# Patient Record
Sex: MALE | Race: WHITE | Employment: OTHER | URBAN - METROPOLITAN AREA
[De-identification: names, ages, dates, MRNs, and addresses within clinical notes are randomized per-mention and may not be internally consistent; named-entity substitution may affect disease eponyms.]

---

## 2017-02-26 RX ORDER — ETRAVIRINE 100 MG/1
200 TABLET ORAL
Qty: 360 TAB | Refills: 0 | Status: SHIPPED | OUTPATIENT
Start: 2017-02-26 | End: 2018-02-14 | Stop reason: ALTCHOICE

## 2017-05-15 ENCOUNTER — OFFICE VISIT (OUTPATIENT)
Dept: INTERNAL MEDICINE CLINIC | Age: 52
End: 2017-05-15

## 2017-05-15 VITALS
HEIGHT: 68 IN | RESPIRATION RATE: 14 BRPM | BODY MASS INDEX: 23.04 KG/M2 | DIASTOLIC BLOOD PRESSURE: 80 MMHG | TEMPERATURE: 97.9 F | HEART RATE: 96 BPM | SYSTOLIC BLOOD PRESSURE: 124 MMHG | WEIGHT: 152 LBS | OXYGEN SATURATION: 95 %

## 2017-05-15 DIAGNOSIS — Z23 NEED FOR PNEUMOCOCCAL VACCINE: ICD-10-CM

## 2017-05-15 DIAGNOSIS — B20 HUMAN IMMUNODEFICIENCY VIRUS (HIV) DISEASE (HCC): Primary | ICD-10-CM

## 2017-05-15 RX ORDER — LORAZEPAM 0.5 MG/1
0.5 TABLET ORAL
Qty: 30 TAB | Refills: 1 | Status: SHIPPED | OUTPATIENT
Start: 2017-05-15 | End: 2018-02-14 | Stop reason: SDUPTHER

## 2017-05-15 NOTE — PROGRESS NOTES
1. Have you been to the ER, urgent care clinic since your last visit? Hospitalized since your last visit? No    2. Have you seen or consulted any other health care providers outside of the [address] [place] [address] since your last visit? Include any pap smears or colon screening. No       Chief Complaint   Patient presents with    HIV     6 month follow up     Not fasting. Requesting refill of Lorazepam today.

## 2017-05-15 NOTE — MR AVS SNAPSHOT
Visit Information Date & Time Provider Department Dept. Phone Encounter #  
 5/15/2017 10:15 AM Hardeep Lee, [address] [place] [address],[address] [phone] [id number] Follow-up Instructions Return if symptoms worsen or fail to improve. Upcoming Health Maintenance Date Due DTaP/Tdap/Td series (1 - Tdap) 7/18/1988 Pneumococcal 19-64 Highest Risk (3 of 3 - PPSV23) 2/14/2016 INFLUENZA AGE 9 TO ADULT 8/1/2017 Allergies as of 5/15/2017  Review Complete On: 5/15/2017 By: Hardeep Lee MD  
  
 Severity Noted Reaction Type Reactions Percocet [Oxycodone-acetaminophen]  07/06/2009    Nausea and Vomiting Vicodin [Hydrocodone-acetaminophen]  07/06/2009    Itching Current Immunizations  Reviewed on 5/15/2017 Name Date Influenza High Dose Vaccine PF 11/2/2015 Influenza Vaccine 11/14/2016, 10/24/2013 Influenza Vaccine PF 10/27/2014 Influenza Vaccine Split 9/14/2009 Influenza Vaccine Whole 12/6/2012 Pneumococcal Conjugate (PCV-13) 5/18/2015 Pneumococcal Polysaccharide (PPSV-23)  Incomplete Pneumococcal Vaccine (Unspecified Type) 2/14/2011 Reviewed by Hardeep Lee MD on 5/15/2017 at 10:29 AM  
You Were Diagnosed With   
  
 Codes Comments Human immunodeficiency virus (HIV) disease ([facility]    -  Primary ICD-10-CM: B20 
ICD-9-CM: 505 Need for pneumococcal vaccine     ICD-10-CM: N70 ICD-9-CM: V03.82 Vitals BP Pulse Temp Resp Height(growth percentile) Weight(growth percentile) 124/80 (BP 1 Location: Left arm, BP Patient Position: Sitting) 96 97.9 °F (36.6 °C) (Oral) 14 5' 8\" (1.727 m) 152 lb (68.9 kg) SpO2 BMI Smoking Status 95% 23.11 kg/m2 Former Smoker Vitals History BMI and BSA Data Body Mass Index Body Surface Area  
 23.11 kg/m 2 1.82 m 2 Preferred Pharmacy Pharmacy Name Phone [address], [facility] [address] Your Updated Medication List  
  
 This list is accurate as of: 5/15/17 10:39 AM.  Always use your most recent med list.  
  
  
  
  
 albuterol 90 mcg/actuation inhaler Commonly known as:  VENTOLIN HFA Take 1 Puff by inhalation every four (4) hours as needed for Wheezing. darunavir 600 mg tablet Commonly known as:  PREZISTA Take 600 mg by mouth two (2) times daily (with meals). emtricitabine-tenofovir (TDF) 200-300 mg per tablet Commonly known as:  Negro Muster Take 1 Tab by mouth daily. Etravirine 100 mg tablet Commonly known as:  Delanna Fitting Take 2 Tabs by mouth two (2) times daily (after meals). loperamide 2 mg tablet Commonly known as:  IMMODIUM Take 2 mg by mouth as needed for Diarrhea. LORazepam 0.5 mg tablet Commonly known as:  ATIVAN Take 1 Tab by mouth nightly as needed for Anxiety. Max Daily Amount: 0.5 mg. MULTIVITAMIN PO Take  by mouth. NICODERM CQ 21 mg/24 hr  
Generic drug:  nicotine 1 Patch by TransDERmal route every twenty-four (24) hours. Decreasing to 14 mg next week. predniSONE 10 mg dose pack Commonly known as:  STERAPRED DS Take as directed  
  
 ritonavir 100 mg capsule Commonly known as:  Justice Ruck Take 100 mg by mouth two (2) times daily (with meals). Prescriptions Printed Refills LORazepam (ATIVAN) 0.5 mg tablet 1 Sig: Take 1 Tab by mouth nightly as needed for Anxiety. Max Daily Amount: 0.5 mg.  
 Class: Print Route: Oral  
  
We Performed the Following HIV-1 RNA QT BY PCR [47795 CPT(R)] LYMPHOCYTES, CD4 PERCENT AND ABSOLUTE [67824 CPT(R)] METABOLIC PANEL, BASIC [47713 CPT(R)] PNEUMOCOCCAL POLYSACCHARIDE VACCINE, 23-VALENT, ADULT OR IMMUNOSUPPRESSED PT DOSE, [25191 CPT(R)] Follow-up Instructions Return if symptoms worsen or fail to improve. Introducing [facility]!    
 Danette Rodríguez introduces Z[employer]oop patient portal. Now you can access parts of your medical record, email your doctor's office, and request medication refills online. 1. In your internet browser, go to [email or web address] 2. Click on the First Time User? Click Here link in the Sign In box. You will see the New Member Sign Up page. 3. Enter your Anyone Home Access Code exactly as it appears below. You will not need to use this code after youve completed the sign-up process. If you do not sign up before the expiration date, you must request a new code. · Anyone Home Access Code: [email or web address] Expires: 8/13/2017 10:38 AM 
 
4. Enter the last four digits of your Social Security Number (xxxx) and Date of Birth (mm/dd/yyyy) as indicated and click Submit. You will be taken to the next sign-up page. 5. Create a Anyone Home ID. This will be your Anyone Home login ID and cannot be changed, so think of one that is secure and easy to remember. 6. Create a Anyone Home password. You can change your password at any time. 7. Enter your Password Reset Question and Answer. This can be used at a later time if you forget your password. 8. Enter your e-mail address. You will receive e-mail notification when new information is available in [address] 9. Click Sign Up. You can now view and download portions of your medical record. 10. Click the Download Summary menu link to download a portable copy of your medical information. If you have questions, please visit the Frequently Asked Questions section of the Anyone Home website. Remember, Anyone Home is NOT to be used for urgent needs. For medical emergencies, dial 911. Now available from your iPhone and Android! Please provide this summary of care documentation to your next provider. Your primary care clinician is listed as [place]. If you have any questions after today's visit, please call [phone].

## 2017-05-16 LAB
BASOPHILS # BLD AUTO: 0 X10E3/UL (ref 0–0.2)
BASOPHILS NFR BLD AUTO: 1 %
BUN SERPL-MCNC: 11 MG/DL (ref 6–24)
BUN/CREAT SERPL: 12 (ref 9–20)
CALCIUM SERPL-MCNC: 9.2 MG/DL (ref 8.7–10.2)
CD3+CD4+ CELLS # BLD: 751 /UL (ref 359–1519)
CD3+CD4+ CELLS NFR BLD: 41.7 % (ref 30.8–58.5)
CHLORIDE SERPL-SCNC: 101 MMOL/L (ref 96–106)
CO2 SERPL-SCNC: 22 MMOL/L (ref 18–29)
CREAT SERPL-MCNC: 0.93 MG/DL (ref 0.76–1.27)
EOSINOPHIL # BLD AUTO: 0.1 X10E3/UL (ref 0–0.4)
EOSINOPHIL NFR BLD AUTO: 1 %
ERYTHROCYTE [DISTWIDTH] IN BLOOD BY AUTOMATED COUNT: 12.7 % (ref 12.3–15.4)
GLUCOSE SERPL-MCNC: 90 MG/DL (ref 65–99)
HCT VFR BLD AUTO: 43.8 % (ref 37.5–51)
HGB BLD-MCNC: 15 G/DL (ref 12.6–17.7)
HIV1 RNA # SERPL NAA+PROBE: 40 COPIES/ML
HIV1 RNA SERPL NAA+PROBE-LOG#: 1.6 LOG10COPY/ML
IMM GRANULOCYTES # BLD: 0 X10E3/UL (ref 0–0.1)
IMM GRANULOCYTES NFR BLD: 0 %
LYMPHOCYTES # BLD AUTO: 1.8 X10E3/UL (ref 0.7–3.1)
LYMPHOCYTES NFR BLD AUTO: 26 %
MCH RBC QN AUTO: 33.6 PG (ref 26.6–33)
MCHC RBC AUTO-ENTMCNC: 34.2 G/DL (ref 31.5–35.7)
MCV RBC AUTO: 98 FL (ref 79–97)
MONOCYTES # BLD AUTO: 0.8 X10E3/UL (ref 0.1–0.9)
MONOCYTES NFR BLD AUTO: 11 %
NEUTROPHILS # BLD AUTO: 4.2 X10E3/UL (ref 1.4–7)
NEUTROPHILS NFR BLD AUTO: 61 %
PLATELET # BLD AUTO: 190 X10E3/UL (ref 150–379)
POTASSIUM SERPL-SCNC: 4.4 MMOL/L (ref 3.5–5.2)
RBC # BLD AUTO: 4.47 X10E6/UL (ref 4.14–5.8)
SODIUM SERPL-SCNC: 140 MMOL/L (ref 134–144)
WBC # BLD AUTO: 7 X10E3/UL (ref 3.4–10.8)

## 2017-08-14 ENCOUNTER — OFFICE VISIT (OUTPATIENT)
Dept: INTERNAL MEDICINE CLINIC | Age: 52
End: 2017-08-14

## 2017-08-14 VITALS
DIASTOLIC BLOOD PRESSURE: 81 MMHG | HEART RATE: 97 BPM | RESPIRATION RATE: 18 BRPM | BODY MASS INDEX: 22.73 KG/M2 | OXYGEN SATURATION: 95 % | SYSTOLIC BLOOD PRESSURE: 144 MMHG | TEMPERATURE: 98.1 F | HEIGHT: 68 IN | WEIGHT: 150 LBS

## 2017-08-14 DIAGNOSIS — B20 HUMAN IMMUNODEFICIENCY VIRUS (HIV) DISEASE (HCC): Primary | ICD-10-CM

## 2017-08-14 NOTE — PROGRESS NOTES
Osbaldo Greer MD   Infectious Diseases             HPI Cash Henry is a 54 y.o., male and is here today for follow up of HIV infection. The patient has been 100% compliant with HAART. Last CD4 count and Viral Load [id number] (41.7%) and 40 cps/ml on 5/15/17. BMP on that date was normal.  Current HAART is comprised of boosted darunavir,etravirine, and Truvada. He checked with his insurer regarding possible switch to the new or once daily drugs (Genvoya plus Prezcobix), which would have decreased his pill burden significantly. It was too expensive. He would like to try copay cards with both meds, however.      Still smoking under stress, but less so. 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 No new medications have been added since last visit.      Allergy history, medication list, past medical history, and social history were all reviewed. No changes were made in any of these.      Up to date on all immunizations.              ROS   No fever, sweats, chills, nausea, vomiting, diarrhea, or rash. Weight has been stable. Level of energy is good. No changes in vision. No headaches or cognitive impairment. No medications have been added to the regimen since last visit. No signs of peripheral neuropathy.       Physical Exam  Vital signs and weight are stable. EOMI. Sclera anicteric. No thrush or leukoplakia. Lungs clear to A&P. Regular rhythm without murmur. Abdomen without organomegaly, mass, or tenderness. Bowel sounds normal. No joint abnormalities or edema. No lymphadenopathy. Neurologic exam is nonfocal.          ASSESSMENT and PLAN  #1: HIV infection. HIV is clinically and virologically stable. No change in HAART is planned at this time. CD4 count and viral load will be ordered today. Patient will call back for lab work in one week and will return in 4 mos. Copay cards given for Genvoya and Prezcobix. He will check with his mail order and local pharmacy to see if they will honor these.  .      #2: Tobacco abuse.  Again counseled on quitting smoking.

## 2017-08-14 NOTE — MR AVS SNAPSHOT
Visit Information Date & Time Provider Department Dept. Phone Encounter #  
 8/14/2017 11:30 AM Manuelito Mayes, [address] [place] [address],[address] [phone] [id number] Follow-up Instructions Return in about 4 months (around 12/14/2017), or if symptoms worsen or fail to improve. Upcoming Health Maintenance Date Due DTaP/Tdap/Td series (1 - Tdap) 7/18/1988 FOBT Q 1 YEAR AGE 50-75 7/18/2017 INFLUENZA AGE 9 TO ADULT 8/1/2017 Allergies as of 8/14/2017  Review Complete On: 8/14/2017 By: Manuelito Mayes MD  
  
 Severity Noted Reaction Type Reactions Percocet [Oxycodone-acetaminophen]  07/06/2009    Nausea and Vomiting Vicodin [Hydrocodone-acetaminophen]  07/06/2009    Itching Current Immunizations  Reviewed on 8/14/2017 Name Date Influenza High Dose Vaccine PF 11/2/2015 Influenza Vaccine 11/14/2016, 10/24/2013 Influenza Vaccine PF 10/27/2014 Influenza Vaccine Split 9/14/2009 Influenza Vaccine Whole 12/6/2012 Pneumococcal Conjugate (PCV-13) 5/18/2015 Pneumococcal Polysaccharide (PPSV-23) 5/15/2017 ZZZ-RETIRED (DO NOT USE) Pneumococcal Vaccine (Unspecified Type) 2/14/2011 Reviewed by Manuelito Mayes MD on 8/14/2017 at 11:25 AM  
You Were Diagnosed With   
  
 Codes Comments Human immunodeficiency virus (HIV) disease ([facility]    -  Primary ICD-10-CM: B20 
ICD-9-CM: 003 Vitals BP Pulse Temp Resp Height(growth percentile) Weight(growth percentile) 144/81 (BP 1 Location: Left arm, BP Patient Position: Sitting) 97 98.1 °F (36.7 °C) (Oral) 18 5' 8\" (1.727 m) 150 lb (68 kg) SpO2 BMI Smoking Status 95% 22.81 kg/m2 Current Some Day Smoker Vitals History BMI and BSA Data Body Mass Index Body Surface Area  
 22.81 kg/m 2 1.81 m 2 Preferred Pharmacy Pharmacy Name Phone [address], [facility] [address] Your Updated Medication List  
  
   
 This list is accurate as of: 8/14/17 11:33 AM.  Always use your most recent med list.  
  
  
  
  
 albuterol 90 mcg/actuation inhaler Commonly known as:  VENTOLIN HFA Take 1 Puff by inhalation every four (4) hours as needed for Wheezing. darunavir 600 mg tablet Commonly known as:  PREZISTA Take 1 Tab by mouth two (2) times daily (with meals). darunavir-cobicistat 800-150 mg-mg per tablet Commonly known as:  PREZCOBIX Take 1 Tab by mouth daily for 90 days. elvitegravir-cobicistat-emtricitabine-tenofovir alafenamide Tab tablet Commonly known as:  Uday Singh Take 1 Tab by mouth daily for 90 days. emtricitabine-tenofovir (TDF) 200-300 mg per tablet Commonly known as:  Cesar Mention Take 1 Tab by mouth daily. Etravirine 100 mg tablet Commonly known as:  Seamus Anne Take 2 Tabs by mouth two (2) times daily (after meals). loperamide 2 mg tablet Commonly known as:  IMMODIUM Take 2 mg by mouth as needed for Diarrhea. LORazepam 0.5 mg tablet Commonly known as:  ATIVAN Take 1 Tab by mouth nightly as needed for Anxiety. Max Daily Amount: 0.5 mg. MULTIVITAMIN PO Take  by mouth. NICODERM CQ 21 mg/24 hr  
Generic drug:  nicotine 1 Patch by TransDERmal route every twenty-four (24) hours. Decreasing to 14 mg next week. predniSONE 10 mg dose pack Commonly known as:  STERAPRED DS Take as directed  
  
 ritonavir 100 mg capsule Commonly known as:  Ulysses Edu Take 1 Cap by mouth two (2) times daily (with meals). Prescriptions Printed Refills  
 darunavir-cobicistat (PREZCOBIX) 800-150 mg-mg per tablet 5 Sig: Take 1 Tab by mouth daily for 90 days. Class: Print Route: Oral  
 elvitegravir-cobicistat-emtricitabine-tenofovir alafenamide (GENVOYA) tab tablet 5 Sig: Take 1 Tab by mouth daily for 90 days. Class: Print Route: Oral  
  
We Performed the Following HIV-1 RNA QT BY PCR [28415 CPT(R)] LYMPHOCYTES, CD4 PERCENT AND ABSOLUTE [64404 CPT(R)] Follow-up Instructions Return in about 4 months (around 12/14/2017), or if symptoms worsen or fail to improve. Introducing [facility]! Sheri Baeza introduces [employer] patient portal. Now you can access parts of your medical record, email your doctor's office, and request medication refills online. 1. In your internet browser, go to [email or web address] 2. Click on the First Time User? Click Here link in the Sign In box. You will see the New Member Sign Up page. 3. Enter your Miner Access Code exactly as it appears below. You will not need to use this code after youve completed the sign-up process. If you do not sign up before the expiration date, you must request a new code. · Miner Access Code: [email or web address] Expires: 11/12/2017 11:28 AM 
 
4. Enter the last four digits of your Social Security Number (xxxx) and Date of Birth (mm/dd/yyyy) as indicated and click Submit. You will be taken to the next sign-up page. 5. Create a Miner ID. This will be your Miner login ID and cannot be changed, so think of one that is secure and easy to remember. 6. Create a Miner password. You can change your password at any time. 7. Enter your Password Reset Question and Answer. This can be used at a later time if you forget your password. 8. Enter your e-mail address. You will receive e-mail notification when new information is available in [address] 9. Click Sign Up. You can now view and download portions of your medical record. 10. Click the Download Summary menu link to download a portable copy of your medical information. If you have questions, please visit the Frequently Asked Questions section of the Miner website. Remember, Miner is NOT to be used for urgent needs. For medical emergencies, dial 911. Now available from your iPhone and Android! Please provide this summary of care documentation to your next provider. Your primary care clinician is listed as [place]. If you have any questions after today's visit, please call [phone].

## 2017-08-15 DIAGNOSIS — B20 HUMAN IMMUNODEFICIENCY VIRUS (HIV) DISEASE (HCC): ICD-10-CM

## 2017-08-15 LAB
BASOPHILS # BLD AUTO: 0.1 X10E3/UL (ref 0–0.2)
BASOPHILS NFR BLD AUTO: 1 %
CD3+CD4+ CELLS # BLD: 702 /UL (ref 359–1519)
CD3+CD4+ CELLS NFR BLD: 41.3 % (ref 30.8–58.5)
EOSINOPHIL # BLD AUTO: 0.1 X10E3/UL (ref 0–0.4)
EOSINOPHIL NFR BLD AUTO: 2 %
ERYTHROCYTE [DISTWIDTH] IN BLOOD BY AUTOMATED COUNT: 12.5 % (ref 12.3–15.4)
HCT VFR BLD AUTO: 42.9 % (ref 37.5–51)
HGB BLD-MCNC: 14.8 G/DL (ref 12.6–17.7)
HIV1 RNA # SERPL NAA+PROBE: <20 COPIES/ML
HIV1 RNA SERPL NAA+PROBE-LOG#: NORMAL LOG10COPY/ML
IMM GRANULOCYTES # BLD: 0 X10E3/UL (ref 0–0.1)
IMM GRANULOCYTES NFR BLD: 0 %
LYMPHOCYTES # BLD AUTO: 1.7 X10E3/UL (ref 0.7–3.1)
LYMPHOCYTES NFR BLD AUTO: 31 %
MCH RBC QN AUTO: 34.2 PG (ref 26.6–33)
MCHC RBC AUTO-ENTMCNC: 34.5 G/DL (ref 31.5–35.7)
MCV RBC AUTO: 99 FL (ref 79–97)
MONOCYTES # BLD AUTO: 0.7 X10E3/UL (ref 0.1–0.9)
MONOCYTES NFR BLD AUTO: 14 %
NEUTROPHILS # BLD AUTO: 2.8 X10E3/UL (ref 1.4–7)
NEUTROPHILS NFR BLD AUTO: 52 %
PLATELET # BLD AUTO: 212 X10E3/UL (ref 150–379)
RBC # BLD AUTO: 4.33 X10E6/UL (ref 4.14–5.8)
WBC # BLD AUTO: 5.3 X10E3/UL (ref 3.4–10.8)

## 2017-08-17 ENCOUNTER — DOCUMENTATION ONLY (OUTPATIENT)
Dept: INTERNAL MEDICINE CLINIC | Age: 52
End: 2017-08-17

## 2017-08-17 NOTE — PROGRESS NOTES
I erroneously ordered Prezcobix instead of Prezista 800 mg for patient to take daily with Aleksandra Love. Pharmacy notified to make change.

## 2017-08-21 ENCOUNTER — TELEPHONE (OUTPATIENT)
Dept: INTERNAL MEDICINE CLINIC | Age: 52
End: 2017-08-21

## 2017-08-21 NOTE — TELEPHONE ENCOUNTER
Spoke with patient after 2 patient identifiers being note and advised per Dr. Hector Atkinson, that He should be on Prezista 800 mg daily. Patient expressed understanding and has no further questions at this time.

## 2017-08-21 NOTE — TELEPHONE ENCOUNTER
Patient states he needs an urgent call back on medication that is required by Dr. Funmilayo Bernardo. Patient states a medication change was made to prescriptions called in & patient states he needs to be discuss his medications as he needs these to be able to be received by this Friday. Please call.  Thank you

## 2017-11-13 NOTE — TELEPHONE ENCOUNTER
#[phone] pt is requesting a call as he only has 3 more days of meds from Dr. Destiney Arauoj and can't see the new physician as it is on the wrong days. What does he do? Please call to advise.

## 2017-11-13 NOTE — TELEPHONE ENCOUNTER
Identified patient 2 identifiers verified. Patient  Is unable to get an appointment with ID due his work schedule does not work with the ID schedule. Requesting refill no his meds.

## 2018-02-14 ENCOUNTER — OFFICE VISIT (OUTPATIENT)
Dept: FAMILY MEDICINE CLINIC | Age: 53
End: 2018-02-14

## 2018-02-14 VITALS
HEIGHT: 68 IN | SYSTOLIC BLOOD PRESSURE: 116 MMHG | RESPIRATION RATE: 20 BRPM | OXYGEN SATURATION: 97 % | HEART RATE: 98 BPM | TEMPERATURE: 96.5 F | WEIGHT: 150 LBS | DIASTOLIC BLOOD PRESSURE: 82 MMHG | BODY MASS INDEX: 22.73 KG/M2

## 2018-02-14 DIAGNOSIS — B20 HIV (HUMAN IMMUNODEFICIENCY VIRUS INFECTION) (HCC): Primary | ICD-10-CM

## 2018-02-14 DIAGNOSIS — Z76.89 ESTABLISHING CARE WITH NEW DOCTOR, ENCOUNTER FOR: ICD-10-CM

## 2018-02-14 DIAGNOSIS — G47.00 INSOMNIA, UNSPECIFIED TYPE: ICD-10-CM

## 2018-02-14 RX ORDER — LORAZEPAM 0.5 MG/1
0.5 TABLET ORAL
Qty: 15 TAB | Refills: 0 | Status: SHIPPED | OUTPATIENT
Start: 2018-02-14 | End: 2020-07-10

## 2018-02-14 NOTE — PROGRESS NOTES
Venkata Rascon is a 50 y.o. male, who's a new patient to our practice. Previous PCP: stated Nieves Gale but he haven't seen him in 3+ years. has a past medical history of Dyslipidemia; Human immunodeficiency virus (HIV) disease; Hyperglycemia; Insomnia (11/14/2011); and Shingles. ID: HIV saw Dr. Rowdy Olivia, stated saw him on his last day which was 08/2017. It's been 6months since f/u. He says the Other ID doctors out patients are 2days week and \"That's not gonna work for [employer]". I advice him that we'll do 1 refill of 1 month he's almost out of antiviral. His responsibility to f/u with ID. I will do another refer to ID for him, advice we have 2 IDs doctor in this clinic. Insomnia: report takes Lorazepam written by Dr. Martin Fung for sleep, about 2d/week. Advice we'll need to revisit this issue, there are better choices for insomnia. We'll refill #15. Reviewed: active problem list, medication list, allergies, family history, social history, notes from last encounter, lab results    A comprehensive review of systems was negative except for that written in the HPI, on 14 ROS. Allergies   Allergen Reactions    Percocet [Oxycodone-Acetaminophen] Nausea and Vomiting    Vicodin [Hydrocodone-Acetaminophen] Itching     No current outpatient prescriptions on file prior to visit. No current facility-administered medications on file prior to visit. Patient Active Problem List   Diagnosis Code    HIV (human immunodeficiency virus infection) ([facility] B20    Insomnia G47.00       Visit Vitals    /82    Pulse 98    Temp 96.5 °F (35.8 °C) (Oral)    Resp 20    Ht 5' 8\" (1.727 m)    Wt 150 lb (68 kg)    SpO2 97%    BMI 22.81 kg/m2     General appearance: alert, cooperative, no distress, appears stated age  Neurologic: Alert and oriented X 3, normal strength and tone, symmetric. Normal without focal findings. Cranial nerves 2-12 intact. Normal coordination and gait.   Mental status: Alert, oriented, thought content appropriate, affect: stable, mood-congruent. Head: Normocephalic, without obvious abnormality, atraumatic  Eyes: conjunctivae/corneas clear. PERRL, EOM's intact. Neck: supple, symmetrical, trachea midline, no JVD  Lungs: clear to auscultation bilaterally  Heart: regular rate and rhythm, S1, S2 normal, no murmur, click, rub or gallop  Abdomen: soft, non-tender. Extremities: extremities normal, atraumatic, no cyanosis or edema      Assessment/Plans:    Diagnoses and all orders for this visit:    1. HIV (human immunodeficiency virus infection) ([facility]  -     elvitegravir-cobicistat-emtricitabine-tenofovir alafenamide (GENVOYA) tab tablet; Take 1 Tab by mouth daily (with breakfast) for 90 days. -     darunavir (PREZISTA) 600 mg tablet; Take 1 Tab by mouth two (2) times daily (with meals). -     [facility] Infectious Disease ref [facility]    2. Establishing care with new doctor, encounter for    3. Insomnia, unspecified type  -     LORazepam (ATIVAN) 0.5 mg tablet; Take 1 Tab by mouth nightly as needed for Anxiety. Max Daily Amount: 0.5 mg.      Discussed plans, risk/benefits of treatments/observations. Through the use of shared decision making, above plans were agreed upon. Medication compliance advised. Patient verbalized understanding.      Follow-up Disposition:  Return in about 2 months (around 4/14/2018) for annual exam.      Kya Turpin MD  2/14/2018

## 2018-02-14 NOTE — MR AVS SNAPSHOT
[address] [place] [address] [facility] 
[phone] Patient: Nina Leal MRN:  [id number] Visit Information Date & Time Provider Department Dept. Phone Encounter #  
 2018 10:20 AM MD Misha Lopezanaya Guillen at [address] [place] [address] [id number] Follow-up Instructions Return in about 2 months (around 2018) for annual exam. Upcoming Health Maintenance Date Due DTaP/Tdap/Td series (1 - Tdap) 1988 FOBT Q 1 YEAR AGE 50-75 2017 Allergies as of 2018  Review Complete On: 2018 By: Amandeep Kumari MD  
  
 Severity Noted Reaction Type Reactions Percocet [Oxycodone-acetaminophen]  2009    Nausea and Vomiting Vicodin [Hydrocodone-acetaminophen]  2009    Itching Current Immunizations  Reviewed on 2017 Name Date Influenza High Dose Vaccine PF 2015 Influenza Vaccine 2016, 10/24/2013 Influenza Vaccine PF 10/27/2014 Influenza Vaccine Split 2009 Influenza Vaccine Whole 2012 Pneumococcal Conjugate (PCV-13) 2015 Pneumococcal Polysaccharide (PPSV-23) 5/15/2017 Td 3/22/2008 ZZZ-RETIRED (DO NOT USE) Pneumococcal Vaccine (Unspecified Type) 2011 Not reviewed this visit You Were Diagnosed With   
  
 Codes Comments HIV (human immunodeficiency virus infection) ([facility]    -  Primary ICD-10-CM: B20 
ICD-9-CM: V08 Establishing care with new doctor, encounter for     ICD-10-CM: Z76.89 
ICD-9-CM: V65.8 Insomnia, unspecified type     ICD-10-CM: G47.00 ICD-9-CM: 780.52 Vitals BP Pulse Temp Resp Height(growth percentile) Weight(growth percentile) 116/82 98 96.5 °F (35.8 °C) (Oral) 20 5' 8\" (1.727 m) 150 lb (68 kg) SpO2 BMI Smoking Status 97% 22.81 kg/m2 Current Every Day Smoker Vitals History BMI and BSA Data Body Mass Index Body Surface Area 22.81 kg/m 2 1.81 m 2 Preferred Pharmacy Pharmacy Name Phone [address] [facility], [address] Your Updated Medication List  
  
   
This list is accurate as of: 2/14/18 10:49 AM.  Always use your most recent med list.  
  
  
  
  
 darunavir 600 mg tablet Commonly known as:  PREZISTA Take 1 Tab by mouth two (2) times daily (with meals). elvitegravir-cobicistat-emtricitabine-tenofovir alafenamide Tab tablet Commonly known as:   [place] Take 1 Tab by mouth daily (with breakfast) for 90 days. LORazepam 0.5 mg tablet Commonly known as:  ATIVAN Take 1 Tab by mouth nightly as needed for Anxiety. Max Daily Amount: 0.5 mg.  
  
  
  
  
Prescriptions Printed Refills LORazepam (ATIVAN) 0.5 mg tablet 0 Sig: Take 1 Tab by mouth nightly as needed for Anxiety. Max Daily Amount: 0.5 mg.  
 Class: Print Route: Oral  
  
Prescriptions Sent to Pharmacy Refills  
 elvitegravir-cobicistat-emtricitabine-tenofovir alafenamide (GENVOYA) tab tablet 0 Sig: Take 1 Tab by mouth daily (with breakfast) for 90 days. Class: Normal  
 Pharmacy: [address] Ph #: [phone] Route: Oral  
 darunavir (PREZISTA) 600 mg tablet 0 Sig: Take 1 Tab by mouth two (2) times daily (with meals). Class: Normal  
 Pharmacy: [address] Ph #: [phone] Route: Oral  
  
We Performed the Following REFERRAL TO INFECTIOUS DISEASE [REF37 Custom] Follow-up Instructions Return in about 2 months (around 4/14/2018) for annual exam.  
  
  
Referral Information Referral ID Referred By Referred To  
  
 [id number] ARABELLA NETTLES MD   
   [place] [address] , [address] Phone: [phone] Fax: [phone] Visits Status Start Date End Date 1 New Request 2/14/18 2/14/19 If your referral has a status of pending review or denied, additional information will be sent to support the outcome of this decision. Introducing [facility]! Cadence Costa introduces [employer] patient portal. Now you can access parts of your medical record, email your doctor's office, and request medication refills online. 1. In your internet browser, go to [email or web address] 2. Click on the First Time User? Click Here link in the Sign In box. You will see the New Member Sign Up page. 3. Enter your [employer] Access Code exactly as it appears below. You will not need to use this code after youve completed the sign-up process. If you do not sign up before the expiration date, you must request a new code. · [employer] Access Code: [email or web address] Expires: 5/15/2018 10:49 AM 
 
4. Enter the last four digits of your Social Security Number (xxxx) and Date of Birth (mm/dd/yyyy) as indicated and click Submit. You will be taken to the next sign-up page. 5. Create a [employer] ID. This will be your [employer] login ID and cannot be changed, so think of one that is secure and easy to remember. 6. Create a [employer] password. You can change your password at any time. 7. Enter your Password Reset Question and Answer. This can be used at a later time if you forget your password. 8. Enter your e-mail address. You will receive e-mail notification when new information is available in [address] 9. Click Sign Up. You can now view and download portions of your medical record. 10. Click the Download Summary menu link to download a portable copy of your medical information. If you have questions, please visit the Frequently Asked Questions section of the [employer] website. Remember, [employer] is NOT to be used for urgent needs. For medical emergencies, dial 911. Now available from your iPhone and Android! Please provide this summary of care documentation to your next provider. Your primary care clinician is listed as [place]. If you have any questions after today's visit, please call [phone].

## 2018-02-15 NOTE — TELEPHONE ENCOUNTER
Jayson Buitrago from [facility] @ [phone] calling about the medication   Rx:darunavir (PREZISTA) 600 mg tablet twice a  Day . Pt was taking 800 mg once a day and would like to know if you increasing the medication for the Pt.   Ref #[id number]

## 2018-02-15 NOTE — TELEPHONE ENCOUNTER
MD Tracy Lopez LPN        Caller: Unspecified (Today,  9:32 AM)                     PLease let them know     No, he hadn't f/u with ID for his HIV, he's new to me and he can't remember his dosing. I refilled what was in our chart for 1 month until he reconnect with ID doctor.     Please refill it to whatever dose he was on recently.       Thank you     Amandeep Kumari MD   2/15/2018       Spoke with Pharmacist and med changed to darunavir-cobicistat (PREZCOBIX) 800-150 mg-mg per tablet 1 daily.

## 2018-03-21 ENCOUNTER — TELEPHONE (OUTPATIENT)
Dept: FAMILY MEDICINE CLINIC | Age: 53
End: 2018-03-21

## 2018-03-21 DIAGNOSIS — B20 HIV (HUMAN IMMUNODEFICIENCY VIRUS INFECTION) (HCC): ICD-10-CM

## 2018-03-21 PROBLEM — Z91.199 NONCOMPLIANCE: Status: ACTIVE | Noted: 2018-03-21

## 2018-03-21 NOTE — TELEPHONE ENCOUNTER
Left message    This will be the last month that I refill his HIV medication. As last visit was our first.  I told him I do not manage HIV. i've made refer on 2/14/2018 to ID as well. We have 2 ID doctors here in the office. He last saw Dr. Levar Henriquez in 08/2017, it's not been 7 months out and he makes up various excuses for not seeing ID.       Shazia Dumont MD  3/21/2018

## 2018-03-23 NOTE — TELEPHONE ENCOUNTER
Pls call [facility] in ref to: darunavir ethanolate (PREZISTA) 600 mg       States it was a change and they want to verify       Best number to reach them is [phone]  Ref # [id number]

## 2018-03-26 ENCOUNTER — TELEPHONE (OUTPATIENT)
Dept: FAMILY MEDICINE CLINIC | Age: 53
End: 2018-03-26

## 2018-03-26 NOTE — TELEPHONE ENCOUNTER
Pls call [facility] in ref to   Prezista and prezcobix     They want to know which one should be prescribed     Best number to reach them is [phone]    Ref # [id number]

## 2018-08-29 ENCOUNTER — TELEPHONE (OUTPATIENT)
Dept: INTERNAL MEDICINE CLINIC | Age: 53
End: 2018-08-29

## 2018-08-29 NOTE — TELEPHONE ENCOUNTER
Pt records were faxed to Dr Isabell Bernal office for infectious disease and information is coming through blacked out on the paper.  I spoke with Kristian Means [phone]  Fax [phone]

## 2018-08-29 NOTE — TELEPHONE ENCOUNTER
Patients records have been refaxed. The next option would be to mail them, these records have been faxed multiple times.

## 2019-09-12 NOTE — PROGRESS NOTES
DIDI Charles is a 54 y.o., male and is here today for follow up of HIV infection. The patient has been 100% compliant with HAART. Last CD4 count and Viral Load were 760 (42.2%) and less than 20 cps/ml on 5/16/16. BMP on that date was normal.  Current HAART is comprised of boosted darunavir,etravirine, and Truvada. He checked with his insurer regarding possible switch to the new or once daily drugs (Genvoya plus Prezcobix), which would have decreased his pill burden significantly. However, at this time the increased cost would be prohibitive.     Still smoking under stress, but less so. 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     No new medications have been added since last visit.     Allergy history, medication list, past medical history, and social history were all reviewed. No changes were made in any of these.     Up to date on all immunizations with the exception pneumovax 23.           ROS   No fever, sweats, chills, nausea, vomiting, diarrhea, or rash. Weight has been stable. Level of energy is good. No changes in vision. No headaches or cognitive impairment. No medications have been added to the regimen since last visit. No signs of peripheral neuropathy. Review of systems otherwise negative with greater than 10 systems reviewed.     Physical Exam  Vital signs and weight are stable. EOMI. Sclera anicteric. No thrush or leukoplakia. Lungs clear to A&P. Regular rhythm without murmur. Abdomen without organomegaly, mass, or tenderness. Bowel sounds normal. No joint abnormalities or edema. No lymphadenopathy. Neurologic exam is nonfocal.        ASSESSMENT and PLAN  #1: HIV infection. HIV is clinically and virologically stable. No change in HAART is planned at this time. CD4 count and viral load will be ordered today. Patient will call back for lab work in one week and will return PRN , as I am retiring. #2: Tobacco abuse.  Again counseled on quitting smoking. Pneumovax 23 given today.   Lorazepam refilled.    Patient requests all Lab and Radiology Results on their Discharge Instructions

## 2020-07-10 ENCOUNTER — OFFICE VISIT (OUTPATIENT)
Dept: URGENT CARE | Age: 55
End: 2020-07-10

## 2020-07-10 VITALS — HEART RATE: 62 BPM | RESPIRATION RATE: 18 BRPM | TEMPERATURE: 98.3 F | OXYGEN SATURATION: 96 %

## 2020-07-10 DIAGNOSIS — Z20.822 CLOSE EXPOSURE TO COVID-19 VIRUS: Primary | ICD-10-CM

## 2020-07-10 NOTE — PROGRESS NOTES
This patient was seen in [facility] at [address] [place] [address] Care while in their vehicle due to COVID-19 pandemic with PPE and focused examination in order to decrease community viral transmission. The patient/guardian gave verbal consent to treat. Oneida Clarke is a 48 y.o. male who presents for COVID-19 testing. Was exposed to COVID-19. Denies cough, fever, SOB. PMH: HIV, HLD. Smoker. The history is provided by the patient. Past Medical History:   Diagnosis Date    Dyslipidemia     Human immunodeficiency virus (HIV) disease ([facility]     Hyperglycemia     Insomnia 2011    Shingles     1966        History reviewed. No pertinent surgical history. Family History   Problem Relation Age of Onset    No Known Problems Mother     Heart Disease Father     Heart Disease Sister         Social History     Socioeconomic History    Marital status:      Spouse name: Not on file    Number of children: Not on file    Years of education: Not on file    Highest education level: Not on file   Occupational History    Not on file   Social Needs    Financial resource strain: Not on file    Food insecurity     Worry: Not on file     Inability: Not on file    Transportation needs     Medical: Not on file     Non-medical: Not on file   Tobacco Use    Smoking status: Current Every Day Smoker     Packs/day: 0.25     Years: 35.00     Pack years: 8.75     Types: Cigarettes     Last attempt to quit: 2016     Years since quittin.4    Smokeless tobacco: Never Used   Substance and Sexual Activity    Alcohol use:  Yes     Alcohol/week: 4.2 - 5.0 standard drinks     Types: 5 - 6 Cans of beer per week    Drug use: No    Sexual activity: Yes     Partners: Male   Lifestyle    Physical activity     Days per week: Not on file     Minutes per session: Not on file    Stress: Not on file   Relationships    Social connections     Talks on phone: Not on file     Gets together: Not on file Attends Restorationism service: Not on file     Active member of club or organization: Not on file     Attends meetings of clubs or organizations: Not on file     Relationship status: Not on file    Intimate partner violence     Fear of current or ex partner: Not on file     Emotionally abused: Not on file     Physically abused: Not on file     Forced sexual activity: Not on file   Other Topics Concern    Not on file   Social History Narrative    Not on file                ALLERGIES: Percocet [oxycodone-acetaminophen] and Vicodin [hydrocodone-acetaminophen]    Review of Systems   Constitutional: Negative for activity change, appetite change, chills and fever. HENT: Negative for congestion, rhinorrhea and sore throat. Respiratory: Negative for cough, shortness of breath and wheezing. Cardiovascular: Negative for chest pain. Gastrointestinal: Negative for abdominal pain, diarrhea, nausea and vomiting. Musculoskeletal: Negative for myalgias. Neurological: Negative for headaches. Vitals:    07/10/20 1332   Pulse: 62   Resp: 18   Temp: 98.3 °F (36.8 °C)   SpO2: 96%       Physical Exam  Vitals signs and nursing note reviewed. Constitutional:       General: He is not in acute distress. Appearance: He is well-developed. He is not diaphoretic. Pulmonary:      Effort: Pulmonary effort is normal. No respiratory distress. Breath sounds: Normal breath sounds. No stridor. No wheezing, rhonchi or rales. Neurological:      Mental Status: He is alert. Psychiatric:         Behavior: Behavior normal.         Thought Content:  Thought content normal.         Judgment: Judgment normal.         MDM    ICD-10-CM ICD-9-CM   1. Close exposure to COVID-19 virus  Z20.828 V01.79       Orders Placed This Encounter    NOVEL CORONAVIRUS (COVID-19)     Scheduling Instructions:      1) Due to current limited availability of the COVID-19 PCR test, tests will be prioritized and may not be completed.   2) Order only if the test result will change clinical management or necessary for a return to mission-critical employment decision.              3) Print and instruct patient to adhere to CDC home isolation program. (Link Above)              4) Set up or refer patient for a monitoring program.              5) Have patient sign up for and leverage MyChart (if not previously done). Order Specific Question:   Status     Answer:   Asymptomatic/Surveillance(e.g. pre-op/pre-procedure/pre-delivery/transfer)     Order Specific Question:   Reason for Test     Answer:   Upcoming elective surgery/procedure/delivery, return to work, or discharge to another facility        Self Quarantine  Deep breathing exercises  Tylenol prn  Increase fluids    If signs and symptoms become worse the pt is to go to the ER.          Procedures

## 2020-07-14 LAB — SARS-COV-2, NAA: NOT DETECTED

## 2021-08-27 ENCOUNTER — VIRTUAL VISIT (OUTPATIENT)
Dept: INTERNAL MEDICINE CLINIC | Age: 56
End: 2021-08-27
Payer: COMMERCIAL

## 2021-08-27 DIAGNOSIS — Z13.220 SCREENING, LIPID: ICD-10-CM

## 2021-08-27 DIAGNOSIS — Z11.59 ENCOUNTER FOR HEPATITIS C SCREENING TEST FOR LOW RISK PATIENT: ICD-10-CM

## 2021-08-27 DIAGNOSIS — B20 HIV INFECTION, UNSPECIFIED SYMPTOM STATUS (HCC): Primary | ICD-10-CM

## 2021-08-27 DIAGNOSIS — R73.9 BORDERLINE HYPERGLYCEMIA: ICD-10-CM

## 2021-08-27 PROCEDURE — 99204 OFFICE O/P NEW MOD 45 MIN: CPT | Performed by: INTERNAL MEDICINE

## 2021-08-27 RX ORDER — DOLUTEGRAVIR SODIUM 5 MG/1
1 TABLET, FOR SUSPENSION ORAL DAILY
Qty: 30 EACH | Refills: 1 | Status: SHIPPED | OUTPATIENT
Start: 2021-08-27 | End: 2021-08-31 | Stop reason: CLARIF

## 2021-08-27 RX ORDER — EMTRICITABINE AND TENOFOVIR ALAFENAMIDE 200; 25 MG/1; MG/1
1 TABLET ORAL DAILY
Qty: 30 TABLET | Refills: 1 | Status: SHIPPED | OUTPATIENT
Start: 2021-08-27 | End: 2021-10-22

## 2021-08-27 NOTE — PROGRESS NOTES
Chadwick Durbin (: 1965) is a 49 y.o. male, established patient, here for evaluation of the following chief complaint(s):   New Patient ([phone])     1. Have you been to the ER, urgent care clinic since your last visit? Hospitalized since your last visit? No    2. Have you seen or consulted any other health care providers outside of the [address] [place] [address] since your last visit? Include any pap smears or colon screening. No    On this date 2021 I have spent 8 minutes reviewing previous notes, test results and face to face (virtual) with the patient discussing the diagnosis and importance of compliance with the treatment plan as well as documenting on the day of the visit. Chadwick Durbin, was evaluated through a synchronous (real-time) audio-video encounter. The patient (or guardian if applicable) is aware that this is a billable service. Verbal consent to proceed has been obtained within the past 12 months. The visit was conducted pursuant to the emergency declaration under the [address] [place] [address] authority and the [employer] and [employer] General Act. Patient identification was verified, and a caregiver was present when appropriate. The patient was located in a state where the provider was credentialed to provide care. An electronic signature was used to authenticate this note.   -- Ericka Monsivais

## 2021-08-27 NOTE — PROGRESS NOTES
Case Norris is a 49 y.o. male who was seen by synchronous (real-time) audio-video technology on 8/27/2021 for New Patient ([phone])        Progress Note       SUBJECTIVE:   Mr. Case Norris is a 49 y.o. male who is here for follow up of routine medical issues. He is spouse of my patient Mary Carmen Zaman. Chief Complaint   Patient presents with   [facility] Patient     [phone]       He is a prior patient of Dr. Brittney Robb in our office, for HIV. He moved to [place] 3 years ago, and just moved back. \"My levels have always been undetectable. \"  He is about to run out of meds. He had some testing for COPD years ago. At this time, he is otherwise doing well and has brought no other complaints to my attention today. For a list of the medical issues addressed today, see the assessment and plan below. PMH:   Past Medical History:   Diagnosis Date    Dyslipidemia     Human immunodeficiency virus (HIV) disease ([facility] [address] [facility]     Hyperglycemia     Insomnia 11/14/2011    Shingles     1966       Past Surgical History:   Procedure Laterality Date    HX GI  2004    Rectal fistula       All: He is allergic to percocet [oxycodone-acetaminophen] and vicodin [hydrocodone-acetaminophen]. Current Outpatient Medications   Medication Sig    emtricitabine/tenofov alafenam (DESCOVY PO) Take  by mouth.  dolutegravir sodium (TIVICAY PD PO) Take  by mouth. No current facility-administered medications for this visit. FH: His family history includes Dementia in his mother; Heart Disease in his father and sister. SH: He is a .  He reports that he has been smoking cigarettes. He has a 8.75 pack-year smoking history. He has never used smokeless tobacco. He reports current alcohol use of about 4.2 - 5.0 standard drinks of alcohol per week. He reports that he does not use drugs. He quit cigarettes about a month ago. ROS: See above; Complete ROS otherwise negative. OBJECTIVE:   Vitals:  There were no vitals taken for this visit. Gen: Pleasant 49 y.o.  male in NAD. Lab Results   Component Value Date/Time    Sodium 140 05/15/2017 11:20 AM    Potassium 4.4 05/15/2017 11:20 AM    Chloride 101 05/15/2017 11:20 AM    CO2 22 05/15/2017 11:20 AM    Glucose 90 05/15/2017 11:20 AM    BUN 11 05/15/2017 11:20 AM    Creatinine 0.93 05/15/2017 11:20 AM    BUN/Creatinine ratio 12 05/15/2017 11:20 AM    GFR est  05/15/2017 11:20 AM    GFR est non-AA 96 05/15/2017 11:20 AM    Calcium 9.2 05/15/2017 11:20 AM    Bilirubin, total 0.3 05/16/2016 11:04 AM    ALT (SGPT) 17 05/16/2016 11:04 AM    Alk. phosphatase 74 05/16/2016 11:04 AM    Protein, total 7.0 05/16/2016 11:04 AM    Albumin 4.6 05/16/2016 11:04 AM    A-G Ratio 1.9 05/16/2016 11:04 AM     Lab Results   Component Value Date/Time    WBC 5.3 08/14/2017 11:50 AM    HGB 14.8 08/14/2017 11:50 AM    HCT 42.9 08/14/2017 11:50 AM    PLATELET 035 92/15/7241 11:50 AM    MCV 99 (H) 08/14/2017 11:50 AM         ASSESSMENT/ PLAN: Diagnoses and all orders for this visit:    1. HIV infection, unspecified symptom status ([facility]  -     REFERRAL TO INFECTIOUS DISEASE  -     emtricitabine-tenofovir alafen (Descovy) tablet; Take 1 Tablet by mouth daily. -     dolutegravir (Tivicay PD) 5 mg tbsu; Take 1 Each by mouth daily.  -     LYMPHOCYTES, CD4 PERCENT AND ABSOLUTE; Future  -     CBC WITH AUTOMATED DIFF; Future  -     METABOLIC PANEL, COMPREHENSIVE; Future  -     HIV-1 RNA QT BY PCR; Future    2. Encounter for hepatitis C screening test for low risk patient  -     HEPATITIS C AB; Future    3. Screening, lipid  -     LIPID PANEL; Future    4. Borderline hyperglycemia  -     HEMOGLOBIN A1C WITH EAG; Future        Follow-up and Dispositions    · Return in about 6 months (around 2/27/2022) for follow up. Objective:   No flowsheet data found.      [INSTRUCTIONS:  \"[x]\" Indicates a positive item  \"[]\" Indicates a negative item  -- DELETE ALL ITEMS NOT EXAMINED]    Constitutional: [x] Appears well-developed and well-nourished [x] No apparent distress      [] Abnormal -     Mental status: [x] Alert and awake  [x] Oriented to person/place/time [x] Able to follow commands    [] Abnormal -     Eyes:   EOM    [x]  Normal    [] Abnormal -   Sclera  [x]  Normal    [] Abnormal -          Discharge [x]  None visible   [] Abnormal -     HENT: [x] Normocephalic, atraumatic  [] Abnormal -   [x] Mouth/Throat: Mucous membranes are moist    External Ears [x] Normal  [] Abnormal -    Neck: [x] No visualized mass [] Abnormal -     Pulmonary/Chest: [x] Respiratory effort normal   [x] No visualized signs of difficulty breathing or respiratory distress        [] Abnormal -      Musculoskeletal:   [x] Normal gait with no signs of ataxia         [x] Normal range of motion of neck        [] Abnormal -     Neurological:        [x] No Facial Asymmetry (Cranial nerve 7 motor function) (limited exam due to video visit)          [x] No gaze palsy        [] Abnormal -          Skin:        [x] No significant exanthematous lesions or discoloration noted on facial skin         [] Abnormal -            Psychiatric:       [x] Normal Affect [] Abnormal -       Other pertinent observable physical exam findings:-        We discussed the expected course, resolution and complications of the diagnosis(es) in detail. Medication risks, benefits, costs, interactions, and alternatives were discussed as indicated. I advised him to contact the office if his condition worsens, changes or fails to improve as anticipated. He expressed understanding with the diagnosis(es) and plan. Kikigasper Enamorado, who was evaluated through a patient-initiated, synchronous (real-time) audio-video encounter, and/or his healthcare decision maker, is aware that it is a billable service, with coverage as determined by his insurance carrier. He provided verbal consent to proceed: YES, and patient identification was verified. It was conducted pursuant to the emergency declaration under the [facility], [address] [place] [address] authority and the [employer] and [employer] General Act. A caregiver was present when appropriate. Ability to conduct physical exam was limited. I was in office. The patient was at home or otherwise outside the office.       Ese Farfan MD

## 2021-09-08 ENCOUNTER — TELEPHONE (OUTPATIENT)
Dept: FAMILY MEDICINE CLINIC | Age: 56
End: 2021-09-08

## 2021-09-08 NOTE — TELEPHONE ENCOUNTER
----- Message from Hardeep Tran sent at 9/8/2021  3:09 PM EDT -----  Regarding: MD Cuadra/ Telephone  Patient return call     Caller's first and last name and relationship (if not the patient):  pt      Best contact number(s):    [phone]      Whose call is being returned:  Nancy Gamboa      Details to clarify the request: Pt returning missed call from practice. Caller requesting call back in regards to to scheduling New Patient appointment.

## 2021-09-08 NOTE — TELEPHONE ENCOUNTER
Please schedule pt( new  Patient) on 10/28 or after. I can do refills( if he needs them before he comes in) after seeing lab work.   Thanks

## 2021-09-09 NOTE — TELEPHONE ENCOUNTER
Spoke w/ patient; two patient identifiers confirmed. Appointment scheduled for 10/28/2021 at 2pm for virtual new patient visit. Patient confirmed appointment date/time. Informed that if refills needed prior to appointment to notify office and after labs are reviewed Dr. Savanna Cain ok with filling medications. Patient verbalized understanding.     Jennifer Kaiser LPN

## 2021-09-13 ENCOUNTER — APPOINTMENT (OUTPATIENT)
Dept: INTERNAL MEDICINE CLINIC | Age: 56
End: 2021-09-13

## 2021-09-15 LAB
ALBUMIN SERPL-MCNC: 4.6 G/DL (ref 3.8–4.9)
ALBUMIN/GLOB SERPL: 2 {RATIO} (ref 1.2–2.2)
ALP SERPL-CCNC: 70 IU/L (ref 44–121)
ALT SERPL-CCNC: 13 IU/L (ref 0–44)
AST SERPL-CCNC: 20 IU/L (ref 0–40)
BASOPHILS # BLD AUTO: 0.1 X10E3/UL (ref 0–0.2)
BASOPHILS NFR BLD AUTO: 1 %
BILIRUB SERPL-MCNC: 0.4 MG/DL (ref 0–1.2)
BUN SERPL-MCNC: 12 MG/DL (ref 6–24)
BUN/CREAT SERPL: 12 (ref 9–20)
CALCIUM SERPL-MCNC: 9.4 MG/DL (ref 8.7–10.2)
CD3+CD4+ CELLS # BLD: 615 /UL (ref 359–1519)
CD3+CD4+ CELLS NFR BLD: 36.2 % (ref 30.8–58.5)
CHLORIDE SERPL-SCNC: 103 MMOL/L (ref 96–106)
CHOLEST SERPL-MCNC: 189 MG/DL (ref 100–199)
CO2 SERPL-SCNC: 24 MMOL/L (ref 20–29)
CREAT SERPL-MCNC: 1.02 MG/DL (ref 0.76–1.27)
EOSINOPHIL # BLD AUTO: 0.1 X10E3/UL (ref 0–0.4)
EOSINOPHIL NFR BLD AUTO: 3 %
ERYTHROCYTE [DISTWIDTH] IN BLOOD BY AUTOMATED COUNT: 11.5 % (ref 11.6–15.4)
EST. AVERAGE GLUCOSE BLD GHB EST-MCNC: 94 MG/DL
GLOBULIN SER CALC-MCNC: 2.3 G/DL (ref 1.5–4.5)
GLUCOSE SERPL-MCNC: 82 MG/DL (ref 65–99)
HBA1C MFR BLD: 4.9 % (ref 4.8–5.6)
HCT VFR BLD AUTO: 44.7 % (ref 37.5–51)
HCV AB S/CO SERPL IA: 0.1 S/CO RATIO (ref 0–0.9)
HDLC SERPL-MCNC: 68 MG/DL
HGB BLD-MCNC: 14.8 G/DL (ref 13–17.7)
HIV1 RNA # SERPL NAA+PROBE: <20 COPIES/ML
HIV1 RNA SERPL NAA+PROBE-LOG#: NORMAL LOG10COPY/ML
IMM GRANULOCYTES # BLD AUTO: 0 X10E3/UL (ref 0–0.1)
IMM GRANULOCYTES NFR BLD AUTO: 0 %
LDLC SERPL CALC-MCNC: 97 MG/DL (ref 0–99)
LYMPHOCYTES # BLD AUTO: 1.7 X10E3/UL (ref 0.7–3.1)
LYMPHOCYTES NFR BLD AUTO: 31 %
MCH RBC QN AUTO: 34.1 PG (ref 26.6–33)
MCHC RBC AUTO-ENTMCNC: 33.1 G/DL (ref 31.5–35.7)
MCV RBC AUTO: 103 FL (ref 79–97)
MONOCYTES # BLD AUTO: 0.6 X10E3/UL (ref 0.1–0.9)
MONOCYTES NFR BLD AUTO: 11 %
NEUTROPHILS # BLD AUTO: 3.1 X10E3/UL (ref 1.4–7)
NEUTROPHILS NFR BLD AUTO: 54 %
PLATELET # BLD AUTO: 234 X10E3/UL (ref 150–450)
POTASSIUM SERPL-SCNC: 4.5 MMOL/L (ref 3.5–5.2)
PROT SERPL-MCNC: 6.9 G/DL (ref 6–8.5)
RBC # BLD AUTO: 4.34 X10E6/UL (ref 4.14–5.8)
SODIUM SERPL-SCNC: 141 MMOL/L (ref 134–144)
TRIGL SERPL-MCNC: 139 MG/DL (ref 0–149)
VLDLC SERPL CALC-MCNC: 24 MG/DL (ref 5–40)
WBC # BLD AUTO: 5.7 X10E3/UL (ref 3.4–10.8)

## 2021-09-27 RX ORDER — DOLUTEGRAVIR SODIUM 50 MG/1
TABLET, FILM COATED ORAL
Qty: 30 TABLET | Refills: 1 | Status: SHIPPED | OUTPATIENT
Start: 2021-09-27 | End: 2021-10-22

## 2021-10-21 DIAGNOSIS — B20 HIV INFECTION, UNSPECIFIED SYMPTOM STATUS (HCC): ICD-10-CM

## 2021-10-22 RX ORDER — EMTRICITABINE AND TENOFOVIR ALAFENAMIDE 200; 25 MG/1; MG/1
TABLET ORAL
Qty: 30 TABLET | Refills: 1 | Status: SHIPPED | OUTPATIENT
Start: 2021-10-22 | End: 2021-12-14

## 2021-10-22 RX ORDER — DOLUTEGRAVIR SODIUM 50 MG/1
TABLET, FILM COATED ORAL
Qty: 30 TABLET | Refills: 1 | Status: SHIPPED | OUTPATIENT
Start: 2021-10-22 | End: 2021-11-16

## 2021-10-28 ENCOUNTER — VIRTUAL VISIT (OUTPATIENT)
Dept: INFECTIOUS DISEASES | Age: 56
End: 2021-10-28
Payer: COMMERCIAL

## 2021-10-28 ENCOUNTER — TELEPHONE (OUTPATIENT)
Dept: INFECTIOUS DISEASES | Age: 56
End: 2021-10-28

## 2021-10-28 DIAGNOSIS — E78.5 DYSLIPIDEMIA: ICD-10-CM

## 2021-10-28 DIAGNOSIS — K64.9 HEMORRHOIDS, UNSPECIFIED HEMORRHOID TYPE: ICD-10-CM

## 2021-10-28 DIAGNOSIS — E55.9 VITAMIN D DEFICIENCY: ICD-10-CM

## 2021-10-28 DIAGNOSIS — Z86.19 HISTORY OF SHINGLES: ICD-10-CM

## 2021-10-28 DIAGNOSIS — Z21 ASYMPTOMATIC HIV INFECTION (HCC): Primary | ICD-10-CM

## 2021-10-28 DIAGNOSIS — F17.200 CURRENT SMOKER ON SOME DAYS: ICD-10-CM

## 2021-10-28 PROCEDURE — 99204 OFFICE O/P NEW MOD 45 MIN: CPT | Performed by: INTERNAL MEDICINE

## 2021-10-28 NOTE — TELEPHONE ENCOUNTER
Please mail lab slip to patient. Labs to be done around 3/14 /22.   Next visit end of March in person or virtual.

## 2021-10-28 NOTE — PATIENT INSTRUCTIONS
[employer] Activation    Thank you for requesting access to [email or web address]. Please follow the instructions below to securely access and download your online medical record. [email or web address] allows you to send messages to your doctor, view your test results, renew your prescriptions, schedule appointments, and more. How Do I Sign Up? 1. In your internet browser, go to [email or web address]. 2. Click on the First Time User? Click Here link in the Sign In box. You will see the New Member Sign Up page. 3. Enter your [email or web address] Access Code exactly as it appears below. You will not need to use this code after youve completed the sign-up process. If you do not sign up before the expiration date, you must request a new code. [email or web address] Access Code: Activation code not generated  Current [email or web address] Status: Active (This is the date your [email or web address] access code will )    4. Enter the last four digits of your Social Security Number (xxxx) and Date of Birth (mm/dd/yyyy) as indicated and click Submit. You will be taken to the next sign-up page. 5. Create a [employer] ID. This will be your [email or web address] login ID and cannot be changed, so think of one that is secure and easy to remember. 6. Create a [employer] password. You can change your password at any time. 7. Enter your Password Reset Question and Answer. This can be used at a later time if you forget your password. 8. Enter your e-mail address. You will receive e-mail notification when new information is available in [address] 9. Click Sign Up. You can now view and download portions of your medical record. 10. Click the Download Summary menu link to download a portable copy of your medical information. Additional Information    If you have questions, please visit the Frequently Asked Questions section of the [email or web address] website at [email or web address] Remember, [email or web address] is NOT to be used for urgent needs. For medical emergencies, dial 911.

## 2021-10-28 NOTE — PROGRESS NOTES
Subjective:   Aurora Chandra is a 49 y.o. male who presents for  HIV evaluation. Patient has previously been seen by Dr. Padma Chaney, thereafter moved to [place] is back. Patient and partner moved to [facility]. He has been with his partner 25 years. When was first positive test for HIV?  1998 in [place], [place]. Patient was started on medications around 1990 9/2000. Patient was undetectable within a year. Patient's counts were always good patient has never had AIDS. No history of STDs besides HIV. What was the reason for being tested? Weight loss. Initial CD4-unknown, >200   Initial VL-unknown  Mode of HIV infection: Sexual( MSM)  Patient is currently [facility] [employer]. Previously on Prezcobix, Nieves Clemons. Past history of smoking patient has now quit. Alcohol use-socially  Patient has had shingles in the past.  Advised patient to get shingles vaccine. Patient has questions about Covid vaccine booster. All questions answered patient recommended to get a booster. Patient advised to get Tdap booster if not already had it in the past 10 years. Also flu vaccine. Patient advised to be up-to-date with colon screening, PSA screening. Patient is eating healthy, exercising. Weight is staying healthy. Date Value   Last CD4  9/14/2021 615   Last VL 9/14/21 <20     Patient's usual source of health care:ID Specialist  Oppurtunistic Infections: none  Sexually transmitted diseases: none  Tuberculosis: Any known exposure to MTB  no  Anal Pap:  Immunizations by Immunization family     Influenza Vaccine 9/14/2009 12/6/2012 10/24/2013 10/27/2014     11/2/2015 11/14/2016      Pneumococcal Conjugate (PCV) 5/18/2015       Pneumococcal Polysaccharide (PPSV-23) 5/15/2017       Pneumococcal Vaccine (Unspecified Type) 2/14/2011       Sars-cov-2 (Covid-19) Vaccine, Unspecified  3/8/2021       Td 3/22/2008                 ROS  no specific [id number] point ROS obtained . All other systems negative.     Allergies Allergen Reactions    Percocet [Oxycodone-Acetaminophen] Nausea and Vomiting    Vicodin [Hydrocodone-Acetaminophen] Itching       Current Outpatient Medications   Medication Sig Dispense Refill    Descovy tablet TAKE 1 TABLET BY MOUTH EVERY DAY 30 Tablet 1    Tivicay 50 mg tab tablet TAKE 1 TABLET BY MOUTH EVERY DAY 30 Tablet 1       Past Medical History:   Diagnosis Date    Dyslipidemia     Human immunodeficiency virus (HIV) disease ([facility] [address]     Hyperglycemia     Insomnia 2011    Shingles     1966       Social History     Socioeconomic History    Marital status:      Spouse name: Not on file    Number of children: Not on file    Years of education: Not on file    Highest education level: Not on file   Occupational History    Not on file   Tobacco Use    Smoking status: Current Every Day Smoker     Packs/day: 0.25     Years: 35.00     Pack years: 8.75     Types: Cigarettes     Last attempt to quit: 2016     Years since quittin.8    Smokeless tobacco: Never Used   Substance and Sexual Activity    Alcohol use: Yes     Alcohol/week: 4.2 - 5.0 standard drinks     Types: 5 - 6 Cans of beer per week    Drug use: No    Sexual activity: Yes     Partners: Male   Other Topics Concern    Not on file   Social History Narrative    Not on file     Social Determinants of Health     Financial Resource Strain:     Difficulty of Paying Living Expenses: Not on file   Food Insecurity:     Worried About Running Out of Food in the Last Year: Not on file    Ryan of Food in the Last Year: Not on file   Transportation Needs:     Lack of Transportation (Medical): Not on file    Lack of Transportation (Non-Medical):  Not on file   Physical Activity:     Days of Exercise per Week: Not on file    Minutes of Exercise per Session: Not on file   Stress:     Feeling of Stress : Not on file   Social Connections:     Frequency of Communication with Friends and Family: Not on file    Frequency of Social Gatherings with Friends and Family: Not on file    Attends Spiritism Services: Not on file    Active Member of Clubs or Organizations: Not on file    Attends Club or Organization Meetings: Not on file    Marital Status: Not on file   Intimate Partner Violence:     Fear of Current or Ex-Partner: Not on file    Emotionally Abused: Not on file    Physically Abused: Not on file    Sexually Abused: Not on file   Housing Stability:     Unable to Pay for Housing in the Last Year: Not on file    Number of [place] in the Last Year: Not on file    Unstable Housing in the Last Year: Not on file     Heterosexual- yes  Drug used discussed no  Is the patient sexually active? yes  Safer sex guidelines discussed yes  If yes, partner(s)  aware of patient's status? yes      Psychiatric history. depression        Objective: There were no vitals taken for this visit. Physical exam:  GEN: NAD  NECK- supple  RESP: no distress  NEURO:non focal  No LE edema         Assessment:         IMPRESSION:   1. Asymptomatic HIV stable on Tivicay Descovy  2. P/h  Shingles,pt advised to get shingles vaccine  3. Dyslipidemia follow-up with PCP  4. H/o hyperglycemia stable A1c  5. Smoking history now quit       PLAN:   1. Continue to [address]  2. Healthy diet regular exercise  3. Patient advised to get Covid booster, flu vaccine, Tdap  4. Patient to get colon screening, PSA  5.  Labs and follow-up 6 months     Continue present medication(s)      Orders Placed This Encounter    QUANTIFERON-TB GOLD PLUS ([facility] Default)    HEPATITIS B CORE AB, TOTAL    HEP B SURFACE AG    CBC WITH AUTOMATED DIFF    METABOLIC PANEL, COMPREHENSIVE    HEP B SURFACE AB    HIV-1 RNA QT BY PCR    VITAMIN D, 25 HYDROXY    RPR W/REFLEX TITER AND TREPONEMA ABS    LYMPHOCYTES, CD4 PERCENT AND ABSOLUTE    hydrocortisone-pramoxine (PROCTOFOAM HC) rectal foam       Follow-up and Dispositions    · Return in about 6 months (around 4/28/2022).            Marquis Mary MD

## 2021-10-28 NOTE — TELEPHONE ENCOUNTER
Appointment scheduled for 03/31/2022 at 2:30pm for follow-up. Lab orders and appointment reminder mailed to patient's home address.     Anali Hill LPN

## 2021-10-28 NOTE — PROGRESS NOTES
Virtual Visit  Send link to [phone]    Identified pt with two pt identifiers(name and ). Reviewed record in preparation for visit and have obtained necessary documentation. Chief Complaint   Patient presents with   [address] Patient   [address] Immunization/Injection     questions regarding flu vaccine and booster vaccine for COVID      There were no vitals filed for this visit. Health Maintenance Review: Patient reminded of \"due or due soon\" health maintenance. I have asked the patient to contact his/her primary care provider (PCP) for follow-up on his/her health maintenance. Coordination of Care Questionnaire:  :   1) Have you been to an emergency room, urgent care, or hospitalized since your last visit? If yes, where when, and reason for visit? no       2. Have seen or consulted any other health care provider since your last visit? If yes, where when, and reason for visit? NO      Patient is accompanied by self I have received verbal consent from Carolyn Balnco to discuss any/all medical information while they are present in the room.

## 2021-11-16 RX ORDER — DOLUTEGRAVIR SODIUM 50 MG/1
TABLET, FILM COATED ORAL
Qty: 30 TABLET | Refills: 1 | Status: SHIPPED | OUTPATIENT
Start: 2021-11-16 | End: 2021-12-13

## 2021-12-13 RX ORDER — DOLUTEGRAVIR SODIUM 50 MG/1
TABLET, FILM COATED ORAL
Qty: 30 TABLET | Refills: 1 | Status: SHIPPED | OUTPATIENT
Start: 2021-12-13 | End: 2022-01-10 | Stop reason: SDUPTHER

## 2021-12-14 DIAGNOSIS — B20 HIV INFECTION, UNSPECIFIED SYMPTOM STATUS (HCC): ICD-10-CM

## 2021-12-14 RX ORDER — EMTRICITABINE AND TENOFOVIR ALAFENAMIDE 200; 25 MG/1; MG/1
TABLET ORAL
Qty: 30 TABLET | Refills: 1 | Status: SHIPPED | OUTPATIENT
Start: 2021-12-14 | End: 2022-01-14 | Stop reason: SDUPTHER

## 2022-01-10 RX ORDER — DOLUTEGRAVIR SODIUM 50 MG/1
TABLET, FILM COATED ORAL
Qty: 30 TABLET | Refills: 1 | Status: SHIPPED | OUTPATIENT
Start: 2022-01-10

## 2022-01-12 ENCOUNTER — TELEPHONE (OUTPATIENT)
Dept: INFECTIOUS DISEASES | Age: 57
End: 2022-01-12

## 2022-01-12 NOTE — TELEPHONE ENCOUNTER
You can change patient to Tuesday afternoon of same week( 3/31)  or another Thu after 11. I do not have clinic on Mondays.

## 2022-01-13 NOTE — TELEPHONE ENCOUNTER
LVM for patient to return call. Appointment scheduled for 3/31/2022 will need to be rescheduled to 3/29/2022 or a later Thursday afternoon as Dr. Deepika Ruvalcaba will be out of the office on 3/31/2022.     Roldan Gorman LPN

## 2022-01-14 DIAGNOSIS — B20 HIV INFECTION, UNSPECIFIED SYMPTOM STATUS (HCC): ICD-10-CM

## 2022-01-14 RX ORDER — EMTRICITABINE AND TENOFOVIR ALAFENAMIDE 200; 25 MG/1; MG/1
1 TABLET ORAL DAILY
Qty: 30 TABLET | Refills: 11 | Status: SHIPPED | OUTPATIENT
Start: 2022-01-14 | End: 2022-01-17 | Stop reason: SDUPTHER

## 2022-01-14 NOTE — TELEPHONE ENCOUNTER
Appointment rescheduled for 4/7/2022 at 3pm per patient request via [address]     Daniela Linn LPN

## 2022-01-17 ENCOUNTER — PATIENT MESSAGE (OUTPATIENT)
Dept: INTERNAL MEDICINE CLINIC | Age: 57
End: 2022-01-17

## 2022-01-17 DIAGNOSIS — B20 HIV INFECTION, UNSPECIFIED SYMPTOM STATUS (HCC): ICD-10-CM

## 2022-01-17 NOTE — TELEPHONE ENCOUNTER
From: Yuliya Randhawa  To: Marielos Campos MD  Sent: 1/17/2022 12:37 PM EST  Subject: Ivy Saldaña  Would you please call or fax over adulteration of my Descovy medication. Phone # [phone] or fax# [phone].  I still use [facility] at . [address]   Thank you   Awais Reading

## 2022-01-19 RX ORDER — EMTRICITABINE AND TENOFOVIR ALAFENAMIDE 200; 25 MG/1; MG/1
1 TABLET ORAL DAILY
Qty: 30 TABLET | Refills: 11 | Status: SHIPPED | OUTPATIENT
Start: 2022-01-19

## 2022-02-24 ENCOUNTER — TELEPHONE (OUTPATIENT)
Dept: INFECTIOUS DISEASES | Age: 57
End: 2022-02-24

## 2022-02-25 DIAGNOSIS — Z21 ASYMPTOMATIC HIV INFECTION (HCC): Primary | ICD-10-CM

## 2022-02-25 RX ORDER — EMTRICITABINE AND TENOFOVIR ALAFENAMIDE 200; 25 MG/1; MG/1
1 TABLET ORAL DAILY
Qty: 30 TABLET | Refills: 3 | Status: SHIPPED | OUTPATIENT
Start: 2022-02-25

## 2022-02-25 NOTE — TELEPHONE ENCOUNTER
I sent a new order to pharmacy for both meds -Tivicay, descovy  Please ask Viiv rep ( card on desk ) about co pay assistance. If she provides any relevant  information please notify  patient so he may purchase Tivicay . Patient may use co pay assistance card from [facility] for Han Wang -please give him  Coupon details.

## 2022-02-25 NOTE — TELEPHONE ENCOUNTER
Please call patient's insurance at following number  [phone] is the member service number.   I need to know what other next  steps to appeal the denial of his ARV's

## 2022-02-25 NOTE — TELEPHONE ENCOUNTER
Called healthkeepers provider line [phone] transferred to appeals [phone], which was the main number, patient rep dropped my call twice today. Patient rep reports that there was an appeal sent in, takes 30-45 business days. Rep says that there was no reference number for appeal at this time, pt is under point of service insurance, as far as we know we are a participating practice in [facility]. Asked for what medications could be covered, did not receive an answer.

## 2022-02-25 NOTE — TELEPHONE ENCOUNTER
Called and informed patient about the ADAP and state assistance program, also the pharma copay assistance.  Updated him on note below

## 2022-02-25 NOTE — TELEPHONE ENCOUNTER
Please update patient on this phone conversation. We will also find out if patient is eligible for State assistance with ADAP? Pharmaceutical copay assistance?

## 2022-03-01 ENCOUNTER — TELEPHONE (OUTPATIENT)
Dept: INFECTIOUS DISEASES | Age: 57
End: 2022-03-01

## 2022-03-01 NOTE — TELEPHONE ENCOUNTER
Called and LVM    Signed By: Dunia Patricia     March 1, 2022
Please check with patient if he got his HIV meds.
Detail Level: Zone

## 2022-03-03 ENCOUNTER — TELEPHONE (OUTPATIENT)
Dept: FAMILY MEDICINE CLINIC | Age: 57
End: 2022-03-03

## 2022-03-03 ENCOUNTER — TELEPHONE (OUTPATIENT)
Dept: INTERNAL MEDICINE CLINIC | Age: 57
End: 2022-03-03

## 2022-03-03 ENCOUNTER — DOCUMENTATION ONLY (OUTPATIENT)
Dept: INFECTIOUS DISEASES | Age: 57
End: 2022-03-03

## 2022-03-03 NOTE — TELEPHONE ENCOUNTER
[facility] calling     States a prior Syed Robins is needed for descovy       Best number to reach them is [phone]

## 2022-03-03 NOTE — PROGRESS NOTES
ID  Called  patient to inquire on status of obtaining medications via patient assistance program through co-pay cards  Patient was given co-pay cards for  Tivicay and Descovy. Left voicemail message for patient. Awaiting callback.

## 2022-03-03 NOTE — TELEPHONE ENCOUNTER
[phone]      States that he needs to know if we received the request from the pharmacy for prior auth for hiv medications. Please contact patient to update.

## 2022-03-09 NOTE — TELEPHONE ENCOUNTER
So glad to hear his medication was approved! Please let patient know. Thanks, [place]on Parada!

## 2022-03-19 PROBLEM — Z91.199 NONCOMPLIANCE: Status: ACTIVE | Noted: 2018-03-21

## 2022-03-24 LAB
25(OH)D3+25(OH)D2 SERPL-MCNC: 28.1 NG/ML (ref 30–100)
ALBUMIN SERPL-MCNC: 4.6 G/DL (ref 3.8–4.9)
ALBUMIN/GLOB SERPL: 2 {RATIO} (ref 1.2–2.2)
ALP SERPL-CCNC: 69 IU/L (ref 44–121)
ALT SERPL-CCNC: 16 IU/L (ref 0–44)
AST SERPL-CCNC: 23 IU/L (ref 0–40)
BASOPHILS # BLD AUTO: 0.1 X10E3/UL (ref 0–0.2)
BASOPHILS NFR BLD AUTO: 1 %
BILIRUB SERPL-MCNC: 0.5 MG/DL (ref 0–1.2)
BUN SERPL-MCNC: 11 MG/DL (ref 6–24)
BUN/CREAT SERPL: 11 (ref 9–20)
CALCIUM SERPL-MCNC: 9.2 MG/DL (ref 8.7–10.2)
CD3+CD4+ CELLS # BLD: 581 /UL (ref 359–1519)
CD3+CD4+ CELLS NFR BLD: 34.2 % (ref 30.8–58.5)
CHLORIDE SERPL-SCNC: 102 MMOL/L (ref 96–106)
CO2 SERPL-SCNC: 23 MMOL/L (ref 20–29)
CREAT SERPL-MCNC: 1.02 MG/DL (ref 0.76–1.27)
EGFR: 87 ML/MIN/1.73
EOSINOPHIL # BLD AUTO: 0.1 X10E3/UL (ref 0–0.4)
EOSINOPHIL NFR BLD AUTO: 2 %
ERYTHROCYTE [DISTWIDTH] IN BLOOD BY AUTOMATED COUNT: 11.2 % (ref 11.6–15.4)
GAMMA INTERFERON BACKGROUND BLD IA-ACNC: 0.05 IU/ML
GLOBULIN SER CALC-MCNC: 2.3 G/DL (ref 1.5–4.5)
GLUCOSE SERPL-MCNC: 99 MG/DL (ref 65–99)
HBV CORE AB SERPL QL IA: NEGATIVE
HBV SURFACE AB SER QL: REACTIVE
HBV SURFACE AG SERPL QL IA: NEGATIVE
HCT VFR BLD AUTO: 43.6 % (ref 37.5–51)
HGB BLD-MCNC: 15 G/DL (ref 13–17.7)
HIV1 RNA # SERPL NAA+PROBE: <20 COPIES/ML
HIV1 RNA SERPL NAA+PROBE-LOG#: NORMAL LOG10COPY/ML
IMM GRANULOCYTES # BLD AUTO: 0 X10E3/UL (ref 0–0.1)
IMM GRANULOCYTES NFR BLD AUTO: 0 %
LYMPHOCYTES # BLD AUTO: 1.7 X10E3/UL (ref 0.7–3.1)
LYMPHOCYTES NFR BLD AUTO: 29 %
M TB IFN-G BLD-IMP: NEGATIVE
M TB IFN-G CD4+ BCKGRND COR BLD-ACNC: 0.05 IU/ML
MCH RBC QN AUTO: 33.7 PG (ref 26.6–33)
MCHC RBC AUTO-ENTMCNC: 34.4 G/DL (ref 31.5–35.7)
MCV RBC AUTO: 98 FL (ref 79–97)
MITOGEN IGNF BLD-ACNC: >10 IU/ML
MONOCYTES # BLD AUTO: 0.7 X10E3/UL (ref 0.1–0.9)
MONOCYTES NFR BLD AUTO: 12 %
NEUTROPHILS # BLD AUTO: 3.2 X10E3/UL (ref 1.4–7)
NEUTROPHILS NFR BLD AUTO: 56 %
PLATELET # BLD AUTO: 228 X10E3/UL (ref 150–450)
POTASSIUM SERPL-SCNC: 4.3 MMOL/L (ref 3.5–5.2)
PROT SERPL-MCNC: 6.9 G/DL (ref 6–8.5)
QUANTIFERON INCUBATION, QF1T: NORMAL
QUANTIFERON TB2 AG: 0.04 IU/ML
RBC # BLD AUTO: 4.45 X10E6/UL (ref 4.14–5.8)
RPR SER QL: NON REACTIVE
SERVICE CMNT-IMP: NORMAL
SODIUM SERPL-SCNC: 141 MMOL/L (ref 134–144)
WBC # BLD AUTO: 5.8 X10E3/UL (ref 3.4–10.8)

## 2022-04-07 ENCOUNTER — VIRTUAL VISIT (OUTPATIENT)
Dept: INFECTIOUS DISEASES | Age: 57
End: 2022-04-07
Payer: COMMERCIAL

## 2022-04-07 DIAGNOSIS — Z21 ASYMPTOMATIC HIV INFECTION (HCC): Primary | ICD-10-CM

## 2022-04-07 DIAGNOSIS — E78.5 DYSLIPIDEMIA: ICD-10-CM

## 2022-04-07 DIAGNOSIS — E55.9 VITAMIN D DEFICIENCY: ICD-10-CM

## 2022-04-07 DIAGNOSIS — Z86.19 HISTORY OF SHINGLES: ICD-10-CM

## 2022-04-07 DIAGNOSIS — F17.200 CURRENT SMOKER ON SOME DAYS: ICD-10-CM

## 2022-04-07 PROCEDURE — 99214 OFFICE O/P EST MOD 30 MIN: CPT | Performed by: INTERNAL MEDICINE

## 2022-04-07 NOTE — PROGRESS NOTES
Identified patient 2 identifiers verified. Chief Complaint   Patient presents with    Follow-up     1. Have you been to the ER,no   nourgent care clinic since your last visit? Hospitalized since your last visit?no    2. Have you seen or consulted any other health care providers outside of the [address] [place] [address] since your last visit? Include any pap smears or colon screening.  No

## 2022-06-27 ENCOUNTER — TELEPHONE (OUTPATIENT)
Dept: FAMILY MEDICINE CLINIC | Age: 57
End: 2022-06-27

## 2022-06-27 NOTE — TELEPHONE ENCOUNTER
Pt would like to reschedule his appt     He stated when he was to have the last one he was called a little late and he had to get back to work and didn't get to have the visit       Best number to reach him is [phone]

## 2022-07-01 RX ORDER — EMTRICITABINE AND TENOFOVIR ALAFENAMIDE 120; 15 MG/1; MG/1
1 TABLET ORAL DAILY
Qty: 90 EACH | Refills: 1 | Status: SHIPPED | OUTPATIENT
Start: 2022-07-01

## 2022-08-24 ENCOUNTER — TELEPHONE (OUTPATIENT)
Dept: FAMILY MEDICINE CLINIC | Age: 57
End: 2022-08-24

## 2022-08-24 NOTE — TELEPHONE ENCOUNTER
Identified patient 2 identifiers verified. Patient has [employer], requesting lab orders to have labs done for upcoming appointment.

## 2022-08-25 ENCOUNTER — TELEPHONE (OUTPATIENT)
Dept: INFECTIOUS DISEASES | Age: 57
End: 2022-08-25

## 2022-08-25 ENCOUNTER — TELEPHONE (OUTPATIENT)
Dept: FAMILY MEDICINE CLINIC | Age: 57
End: 2022-08-25

## 2022-08-25 DIAGNOSIS — E78.5 DYSLIPIDEMIA: ICD-10-CM

## 2022-08-25 DIAGNOSIS — E55.9 VITAMIN D DEFICIENCY: ICD-10-CM

## 2022-08-25 DIAGNOSIS — Z21 ASYMPTOMATIC HIV INFECTION (HCC): Primary | ICD-10-CM

## 2022-08-25 NOTE — TELEPHONE ENCOUNTER
Called patient with no answer. Left message asking him to call office. Also sent mychart message to patient to see if he would like to have labslip mailed or if he would like to pick it up from the office.

## 2022-08-26 NOTE — TELEPHONE ENCOUNTER
Sent message to patient via [address] on  for patient.  To let us know about labs & that appt 9/29/22 @ 2PM.

## 2022-08-26 NOTE — TELEPHONE ENCOUNTER
Please check with patient if he needs to have lab slip faxed directly to his lab. He can get labs done right away.   If he still needs change of appointment I could see him on 9/29 at 2 PM.  Thanks

## 2022-09-27 ENCOUNTER — TELEPHONE (OUTPATIENT)
Dept: FAMILY MEDICINE CLINIC | Age: 57
End: 2022-09-27

## 2022-09-27 NOTE — TELEPHONE ENCOUNTER
Pt wants to cancel and reschedule his next appt.    States he works on Thursdays and cannot come in that day (and doesn't know why it would have been scheduled for that day)      Best number to reach him is [phone]

## 2022-09-28 LAB
25(OH)D3+25(OH)D2 SERPL-MCNC: 37.1 NG/ML (ref 30–100)
ALBUMIN SERPL-MCNC: 4.7 G/DL (ref 3.8–4.9)
ALBUMIN/GLOB SERPL: 2 {RATIO} (ref 1.2–2.2)
ALP SERPL-CCNC: 69 IU/L (ref 44–121)
ALT SERPL-CCNC: 17 IU/L (ref 0–44)
AST SERPL-CCNC: 24 IU/L (ref 0–40)
BASOPHILS # BLD AUTO: 0.1 X10E3/UL (ref 0–0.2)
BASOPHILS NFR BLD AUTO: 1 %
BILIRUB SERPL-MCNC: 0.4 MG/DL (ref 0–1.2)
BUN SERPL-MCNC: 11 MG/DL (ref 6–24)
BUN/CREAT SERPL: 11 (ref 9–20)
CALCIUM SERPL-MCNC: 9.4 MG/DL (ref 8.7–10.2)
CD3+CD4+ CELLS # BLD: 537 /UL (ref 359–1519)
CD3+CD4+ CELLS NFR BLD: 31.6 % (ref 30.8–58.5)
CHLORIDE SERPL-SCNC: 104 MMOL/L (ref 96–106)
CO2 SERPL-SCNC: 21 MMOL/L (ref 20–29)
CREAT SERPL-MCNC: 1.02 MG/DL (ref 0.76–1.27)
EGFR: 87 ML/MIN/1.73
EOSINOPHIL # BLD AUTO: 0.2 X10E3/UL (ref 0–0.4)
EOSINOPHIL NFR BLD AUTO: 4 %
ERYTHROCYTE [DISTWIDTH] IN BLOOD BY AUTOMATED COUNT: 12.1 % (ref 11.6–15.4)
GLOBULIN SER CALC-MCNC: 2.4 G/DL (ref 1.5–4.5)
GLUCOSE SERPL-MCNC: 97 MG/DL (ref 65–99)
HCT VFR BLD AUTO: 43.8 % (ref 37.5–51)
HGB BLD-MCNC: 15.2 G/DL (ref 13–17.7)
HIV1 RNA # SERPL NAA+PROBE: <20 COPIES/ML
HIV1 RNA SERPL NAA+PROBE-LOG#: NORMAL LOG10COPY/ML
IMM GRANULOCYTES # BLD AUTO: 0 X10E3/UL (ref 0–0.1)
IMM GRANULOCYTES NFR BLD AUTO: 0 %
LYMPHOCYTES # BLD AUTO: 1.7 X10E3/UL (ref 0.7–3.1)
LYMPHOCYTES NFR BLD AUTO: 31 %
MCH RBC QN AUTO: 34.5 PG (ref 26.6–33)
MCHC RBC AUTO-ENTMCNC: 34.7 G/DL (ref 31.5–35.7)
MCV RBC AUTO: 99 FL (ref 79–97)
MONOCYTES # BLD AUTO: 0.7 X10E3/UL (ref 0.1–0.9)
MONOCYTES NFR BLD AUTO: 13 %
NEUTROPHILS # BLD AUTO: 2.8 X10E3/UL (ref 1.4–7)
NEUTROPHILS NFR BLD AUTO: 51 %
PLATELET # BLD AUTO: 218 X10E3/UL (ref 150–450)
POTASSIUM SERPL-SCNC: 4.5 MMOL/L (ref 3.5–5.2)
PROT SERPL-MCNC: 7.1 G/DL (ref 6–8.5)
RBC # BLD AUTO: 4.41 X10E6/UL (ref 4.14–5.8)
SODIUM SERPL-SCNC: 140 MMOL/L (ref 134–144)
WBC # BLD AUTO: 5.6 X10E3/UL (ref 3.4–10.8)

## 2022-09-29 ENCOUNTER — DOCUMENTATION ONLY (OUTPATIENT)
Dept: INFECTIOUS DISEASES | Age: 57
End: 2022-09-29

## 2022-12-30 ENCOUNTER — PATIENT MESSAGE (OUTPATIENT)
Dept: PRIMARY CARE CLINIC | Age: 57
End: 2022-12-30

## 2023-01-08 DIAGNOSIS — Z21 ASYMPTOMATIC HIV INFECTION (HCC): ICD-10-CM

## 2023-01-08 RX ORDER — DOLUTEGRAVIR SODIUM 50 MG/1
TABLET, FILM COATED ORAL
Qty: 90 TABLET | Refills: 1 | Status: SHIPPED | OUTPATIENT
Start: 2023-01-08

## 2023-01-24 ENCOUNTER — PATIENT MESSAGE (OUTPATIENT)
Dept: INFECTIOUS DISEASES | Age: 58
End: 2023-01-24

## 2023-01-24 DIAGNOSIS — Z21 ASYMPTOMATIC HIV INFECTION (HCC): Primary | ICD-10-CM

## 2023-02-06 DIAGNOSIS — Z21 ASYMPTOMATIC HIV INFECTION (HCC): ICD-10-CM

## 2023-02-06 RX ORDER — EMTRICITABINE AND TENOFOVIR ALAFENAMIDE 200; 25 MG/1; MG/1
1 TABLET ORAL DAILY
Qty: 30 TABLET | Refills: 3 | Status: SHIPPED | OUTPATIENT
Start: 2023-02-06

## 2023-02-07 RX ORDER — EMTRICITABINE AND TENOFOVIR ALAFENAMIDE 120; 15 MG/1; MG/1
1 TABLET ORAL DAILY
Qty: 90 EACH | Refills: 1 | Status: SHIPPED | OUTPATIENT
Start: 2023-02-07

## 2023-02-08 NOTE — PROGRESS NOTES
Subjective:   Jorge Luis Randle is a 56 y.o. male who presents for  HIV evaluation. This is a virtual visit  Patient has previously been seen by Dr. Adam Patel, thereafter moved to [place] is back. Patient and partner moved to [facility]. He has been with his partner 25 years. When was first positive test for HIV?  1998 in [place], [place]. Patient was started on medications around 1990 9/2000. Patient was undetectable within a year. Patient's counts were always good patient has never had AIDS. No history of STDs besides HIV. What was the reason for being tested? Weight loss. Initial CD4-unknown, >200   Initial VL-unknown  Mode of HIV infection: Sexual( MSM)  Patient is currently on [facility]. Previously on Prezcobix, Faina [place]. Past history of smoking patient has now quit. Alcohol use-socially  Patient has had shingles in the past.  Advised patient to get shingles vaccine. Patient has questions about Covid vaccine booster. All questions answered patient recommended to get a booster. Patient advised to get Tdap booster if not already had it in the past 10 years. Also flu vaccine. Patient advised to be up-to-date with colon screening, PSA screening. Patient is eating healthy, exercising. Weight is staying healthy.     Component Ref Range & Units 3/21/22 1042 9/14/21 0000 8/14/17 1150 5/15/17 1120 11/14/16 1053 5/16/16 1104 4/27/15 0845   Abs CD4 [place] [address] - 1,519 /uL 581  615  702  751  760  774  601    % CD 4 Pos Lymph 30.8 - 58.5 % 34.2  36.2  41.3  41.7  42.2  43.0  33.4    WBC 3.4 - 10.8 x10E3/uL 5.8  5.7  5.3  7.0  5.0  6.7  5.5    RBC 4.14 - 5.80 x10E6/uL 4.45  4.34  4.33  4.47  4.69  4.37  4.57    HGB 13.0 - 17.7 g/dL 15.0  14.8  14.8 R  15.0 R  15.6 R  14.7 R  15.3 R                       Component Ref Range & Units 3/21/22 1042 9/14/21 0000 8/14/17 1150 5/15/17 1120 11/14/16 1053 5/16/16 1104 4/27/15 0845   HIV-1 RNA by PCR copies/mL <20  <20 CM  <20 CM  40 CM  <20 CM  <20 CM  <20 CM            Patient's usual source of health care:ID Specialist  Oppurtunistic Infections: none  Sexually transmitted diseases: none  Tuberculosis: Any known exposure to MTB  no  Anal Pap:  Immunizations by Immunization family       COVID-19, [place] Vaccine, Vaccine Unspecified 3/8/2021       Influenza Vaccine 9/14/2009 12/6/2012 10/24/2013 10/27/2014     11/2/2015 11/14/2016      Pneumococcal Conjugate (PCV) 5/18/2015       Pneumococcal Polysaccharide (PPSV-23) 5/15/2017       Pneumococcal Vaccine (Unspecified Type) 2/14/2011       Td 3/22/2008                   ROS  no specific [id number] point ROS obtained . All other systems negative. Allergies   Allergen Reactions    Percocet [Oxycodone-Acetaminophen] Nausea and Vomiting    Vicodin [Hydrocodone-Acetaminophen] Itching       Current Outpatient Medications   Medication Sig Dispense Refill    emtricitabine-tenofovir alafen (Descovy) tablet Take 1 Tablet by mouth daily. 30 Tablet 11    Tivicay 50 mg tab tablet TAKE 1 TABLET BY MOUTH EVERY DAY 30 Tablet 1    emtricitabine-tenofovir alafen (Descovy) 120-15 mg tab Take 1 Each by mouth daily. 90 Each 1    emtricitabine-tenofovir alafen (Descovy) tablet Take 1 Tablet by mouth daily. 30 Tablet 3    dolutegravir (Tivicay) 50 mg tab tablet Take 1 Tablet by mouth daily.  90 Tablet 2    Tivicay 50 mg tab tablet TAKE 1 TABLET BY MOUTH EVERY DAY 90 Tablet 1       Past Medical History:   Diagnosis Date    Dyslipidemia     Human immunodeficiency virus (HIV) disease ([address]     Hyperglycemia     Insomnia 11/14/2011    Shingles     1966       Social History     Socioeconomic History    Marital status:      Spouse name: Not on file    Number of children: Not on file    Years of education: Not on file    Highest education level: Not on file   Occupational History    Not on file   Tobacco Use    Smoking status: Every Day     Packs/day: 0.25     Years: 35.00     Pack years: 8.75     Types: Cigarettes     Last attempt to quit: 2016     Years since quittin.0    Smokeless tobacco: Never   Substance and Sexual Activity    Alcohol use: Yes     Alcohol/week: 4.2 - 5.0 standard drinks     Types: 5 - 6 Cans of beer per week    Drug use: No    Sexual activity: Yes     Partners: Male   Other Topics Concern    Not on file   Social History Narrative    Not on file     Social Determinants of Health     Financial Resource Strain: Not on file   Food Insecurity: Not on file   Transportation Needs: Not on file   Physical Activity: Not on file   Stress: Not on file   Social Connections: Not on file   Intimate Partner Violence: Not on file   Housing Stability: Not on file     Heterosexual- yes  Drug used discussed no  Is the patient sexually active? yes  Safer sex guidelines discussed yes  If yes, partner(s)  aware of patient's status? yes      Psychiatric history. depression        Objective: There were no vitals taken for this visit. Physical exam:None         Assessment:         IMPRESSION:   Asymptomatic HIV stable on Tivicay Descovy  P/h  Shingles,pt advised to get shingles vaccine  Dyslipidemia follow-up with PCP  H/o hyperglycemia stable A1c  Smoking history now quit       PLAN:   Continue to [address] regular exercise  Patient advised to get Covid booster, flu vaccine, Tdap  Patient to get colon screening, PSA  Labs and follow-up 6 months     Continue present medication(s)      No orders of the defined types were placed in this encounter.               Ty Gilmore MD

## 2023-02-08 NOTE — PATIENT INSTRUCTIONS
Zscaler Activation    Thank you for requesting access to Zscaler. Please follow the instructions below to securely access and download your online medical record. Zscaler allows you to send messages to your doctor, view your test results, renew your prescriptions, schedule appointments, and more. How Do I Sign Up? In your internet browser, go to [email or web address]. Click on the First Time User? Click Here link in the Sign In box. You will see the New Member Sign Up page. Enter your Zscaler Access Code exactly as it appears below. You will not need to use this code after youve completed the sign-up process. If you do not sign up before the expiration date, you must request a new code. Zscaler Access Code: Activation code not generated  Current Zscaler Status: Active (This is the date your Zscaler access code will )    Enter the last four digits of your Social Security Number (xxxx) and Date of Birth (mm/dd/yyyy) as indicated and click Submit. You will be taken to the next sign-up page. Create a Zscaler ID. This will be your Zscaler login ID and cannot be changed, so think of one that is secure and easy to remember. Create a Zscaler password. You can change your password at any time. Enter your Password Reset Question and Answer. This can be used at a later time if you forget your password. Enter your e-mail address. You will receive e-mail notification when new information is available in [address] Click Sign Up. You can now view and download portions of your medical record. Click the [place] link to download a portable copy of your medical information. Additional Information    If you have questions, please visit the Frequently Asked Questions section of the Zscaler website at [email or web address] Remember, Zscaler is NOT to be used for urgent needs. For medical emergencies, dial 911.

## 2023-06-02 DIAGNOSIS — B20 HIV INFECTION, UNSPECIFIED SYMPTOM STATUS (HCC): Primary | ICD-10-CM

## 2023-06-02 RX ORDER — EMTRICITABINE AND TENOFOVIR ALAFENAMIDE 200; 25 MG/1; MG/1
1 TABLET ORAL DAILY
Qty: 30 TABLET | Refills: 3 | Status: SHIPPED | OUTPATIENT
Start: 2023-06-02

## 2023-06-14 ENCOUNTER — TELEPHONE (OUTPATIENT)
Age: 58
End: 2023-06-14

## 2023-06-14 DIAGNOSIS — B20 HIV INFECTION, UNSPECIFIED SYMPTOM STATUS (HCC): Primary | ICD-10-CM

## 2023-06-14 RX ORDER — EMTRICITABINE AND TENOFOVIR ALAFENAMIDE 200; 25 MG/1; MG/1
1 TABLET ORAL DAILY
Qty: 90 TABLET | Refills: 1 | Status: SHIPPED | OUTPATIENT
Start: 2023-06-14

## 2023-06-15 NOTE — TELEPHONE ENCOUNTER
Pt lab slips mailed to home address on file. Pt notified via [address] [place] [address]. All instructions in message, including appt. information.

## 2023-07-11 LAB
ALBUMIN SERPL-MCNC: 4.5 G/DL (ref 3.8–4.9)
ALBUMIN/GLOB SERPL: 1.9 {RATIO} (ref 1.2–2.2)
ALP SERPL-CCNC: 71 IU/L (ref 44–121)
ALT SERPL-CCNC: 13 IU/L (ref 0–44)
AST SERPL-CCNC: 18 IU/L (ref 0–40)
BASOPHILS # BLD AUTO: 0.1 X10E3/UL (ref 0–0.2)
BASOPHILS NFR BLD AUTO: 1 %
BILIRUB SERPL-MCNC: 0.3 MG/DL (ref 0–1.2)
BUN SERPL-MCNC: 12 MG/DL (ref 6–24)
BUN/CREAT SERPL: 11 (ref 9–20)
CALCIUM SERPL-MCNC: 9.1 MG/DL (ref 8.7–10.2)
CD3+CD4+ CELLS # BLD: 722 /UL (ref 359–1519)
CD3+CD4+ CELLS NFR BLD: 32.8 % (ref 30.8–58.5)
CHLORIDE SERPL-SCNC: 105 MMOL/L (ref 96–106)
CO2 SERPL-SCNC: 23 MMOL/L (ref 20–29)
CREAT SERPL-MCNC: 1.09 MG/DL (ref 0.76–1.27)
EGFRCR SERPLBLD CKD-EPI 2021: 80 ML/MIN/1.73
EOSINOPHIL # BLD AUTO: 0.2 X10E3/UL (ref 0–0.4)
EOSINOPHIL NFR BLD AUTO: 2 %
ERYTHROCYTE [DISTWIDTH] IN BLOOD BY AUTOMATED COUNT: 11.4 % (ref 11.6–15.4)
GLOBULIN SER CALC-MCNC: 2.4 G/DL (ref 1.5–4.5)
GLUCOSE SERPL-MCNC: 102 MG/DL (ref 70–99)
HBV SURFACE AB SER QL: REACTIVE
HCT VFR BLD AUTO: 43.9 % (ref 37.5–51)
HCV IGG SERPL QL IA: NON REACTIVE
HGB BLD-MCNC: 14.5 G/DL (ref 13–17.7)
IMM GRANULOCYTES # BLD AUTO: 0 X10E3/UL (ref 0–0.1)
IMM GRANULOCYTES NFR BLD AUTO: 0 %
LYMPHOCYTES # BLD AUTO: 2.2 X10E3/UL (ref 0.7–3.1)
LYMPHOCYTES NFR BLD AUTO: 32 %
MCH RBC QN AUTO: 33.3 PG (ref 26.6–33)
MCHC RBC AUTO-ENTMCNC: 33 G/DL (ref 31.5–35.7)
MCV RBC AUTO: 101 FL (ref 79–97)
MONOCYTES # BLD AUTO: 0.8 X10E3/UL (ref 0.1–0.9)
MONOCYTES NFR BLD AUTO: 11 %
NEUTROPHILS # BLD AUTO: 3.7 X10E3/UL (ref 1.4–7)
NEUTROPHILS NFR BLD AUTO: 54 %
PLATELET # BLD AUTO: 234 X10E3/UL (ref 150–450)
POTASSIUM SERPL-SCNC: 4.2 MMOL/L (ref 3.5–5.2)
PROT SERPL-MCNC: 6.9 G/DL (ref 6–8.5)
RBC # BLD AUTO: 4.36 X10E6/UL (ref 4.14–5.8)
SODIUM SERPL-SCNC: 143 MMOL/L (ref 134–144)
WBC # BLD AUTO: 7 X10E3/UL (ref 3.4–10.8)

## 2023-07-12 LAB
HIV1 RNA # SERPL NAA+PROBE: 50 COPIES/ML
HIV1 RNA SERPL NAA+PROBE-LOG#: 1.7 LOG10COPY/ML

## 2023-07-13 LAB
GAMMA INTERFERON BACKGROUND BLD IA-ACNC: 0.04 IU/ML
M TB IFN-G BLD-IMP: NEGATIVE
M TB IFN-G CD4+ T-CELLS BLD-ACNC: 0.06 IU/ML
M TBIFN-G CD4+ CD8+T-CELLS BLD-ACNC: 0.03 IU/ML
MITOGEN IGNF BLD-ACNC: >10 IU/ML
QUANTIFERON, INCUBATION: NORMAL
SERVICE CMNT-IMP: NORMAL

## 2023-07-21 ENCOUNTER — TELEPHONE (OUTPATIENT)
Age: 58
End: 2023-07-21

## 2023-10-25 ENCOUNTER — TELEPHONE (OUTPATIENT)
Age: 58
End: 2023-10-25

## 2023-10-25 NOTE — TELEPHONE ENCOUNTER
Spoke to patient HIPAA verified . Patient is request a Follow up appointment for 1/2/2024.  Ok to leave a message

## 2023-10-30 DIAGNOSIS — B20 HIV INFECTION, UNSPECIFIED SYMPTOM STATUS (HCC): ICD-10-CM

## 2023-11-01 RX ORDER — EMTRICITABINE AND TENOFOVIR ALAFENAMIDE 200; 25 MG/1; MG/1
1 TABLET ORAL DAILY
Qty: 30 TABLET | Refills: 3 | Status: SHIPPED | OUTPATIENT
Start: 2023-11-01

## 2024-01-12 DIAGNOSIS — B20 HIV INFECTION, UNSPECIFIED SYMPTOM STATUS (HCC): Primary | ICD-10-CM

## 2024-01-12 RX ORDER — EMTRICITABINE AND TENOFOVIR ALAFENAMIDE 200; 25 MG/1; MG/1
1 TABLET ORAL DAILY
Qty: 30 TABLET | Refills: 1 | Status: ACTIVE | OUTPATIENT
Start: 2024-01-12 | End: 2024-03-12

## 2024-01-12 RX ORDER — DOLUTEGRAVIR SODIUM 50 MG/1
50 TABLET, FILM COATED ORAL DAILY
Qty: 90 TABLET | Refills: 1 | OUTPATIENT
Start: 2024-01-12

## 2024-01-12 NOTE — TELEPHONE ENCOUNTER
Spoke with patient HIPAA verified . Patient is requesting an appointment for 3/21/2024 at any time . Patient is also requesting a short supply until he is able to be seen.

## 2024-01-12 NOTE — TELEPHONE ENCOUNTER
Patient does want an appointment,   but, needs the prescription for Tivicay 50 mg    He took the last pill yesterday,    he does not want to be without the medication    [facility] [address]    Patient's phone [phone]

## 2024-02-23 ENCOUNTER — OFFICE VISIT (OUTPATIENT)
Age: 59
End: 2024-02-23

## 2024-02-23 VITALS
TEMPERATURE: 98.3 F | HEART RATE: 78 BPM | OXYGEN SATURATION: 98 % | DIASTOLIC BLOOD PRESSURE: 81 MMHG | RESPIRATION RATE: 18 BRPM | WEIGHT: 143.2 LBS | SYSTOLIC BLOOD PRESSURE: 114 MMHG

## 2024-02-23 DIAGNOSIS — U07.1 COVID-19: Primary | ICD-10-CM

## 2024-02-23 LAB
INFLUENZA A ANTIGEN, POC: NEGATIVE
INFLUENZA B ANTIGEN, POC: NEGATIVE
Lab: ABNORMAL
PERFORMING INSTRUMENT: ABNORMAL
QC PASS/FAIL: ABNORMAL
SARS-COV-2, POC: DETECTED

## 2024-02-23 NOTE — PATIENT INSTRUCTIONS
isolation, if your symptoms come back or get worse: Restart your isolation at Day 0. Do this even if it happens after you took medicine for COVID.  Avoid travel and stay away from people at high risk for serious disease for at least 10 days.  Those who can't wear a mask because they are under 2 years old or have certain disabilities should isolate for at least 10 full days.  Check the CDC website at cdc.gov for the most current information on how to protect yourself.  How can you self-isolate when you have COVID-19?  If you have COVID-19, there are things you can do to help avoid spreading the virus to others.  Stay home, and avoid contact with other people.  Limit contact with people in your home. If possible, stay in a separate bedroom and use a separate bathroom.  Wear a high-quality mask when you are around other people.  Improve airflow. If you have to spend time indoors with others, open windows and doors. Or you can use a fan to blow air away from people and out a window.  Avoid contact with pets and other animals.  Cover your mouth and nose with a tissue when you cough or sneeze. Then throw it in the trash right away.  Wash your hands often, especially after you cough or sneeze. Use soap and water, and scrub for at least 20 seconds. If soap and water aren't available, use an alcohol-based hand .  Don't share personal household items. These include bedding, towels, cups and glasses, and eating utensils.  Wash laundry in the warmest water allowed for the fabric type, and dry it completely. It's okay to wash other people's laundry with yours.  Clean and disinfect your home. Use household  and disinfectant wipes or sprays.  Go to the CDC website at cdc.gov if you have questions.  When should you call for help?   Call 911 anytime you think you may need emergency care. For example, call if you have life-threatening symptoms, such as:    You have severe trouble breathing. (You can't talk at all.)

## 2024-02-23 NOTE — PROGRESS NOTES
others    For more information visit the CDC website: https://www.cdc.gov/coronavirus/2019-nCoV/index.html     • Pt would like to try paxlovid, discussed risks and benefits  •   • Follow up with PCP in PRN days if symptoms persist or if symptoms worsen.    SUBJECTIVE/OBJECTIVE:  HPI  HPI:   58 y.o. male presents with symptoms of chills, sweats, malaise/fatigue, congestion, and cough for 1.5 days, gradually worsening.  Denies any CP, SOB, wheezing, abdominal pain, N/V/D.  Smokes cigarettes.  Partner has Flu.  Taking otc cough and cold meds with minimal relief.    Past Medical History:   Diagnosis Date   • Dyslipidemia    • Human immunodeficiency virus (HIV) disease (HCC)    • Hyperglycemia    • Insomnia 11/14/2011   • Shingles     1966     Past Surgical History:   Procedure Laterality Date   • GI  2004    Rectal fistula     Allergies   Allergen Reactions   • Hydrocodone-Acetaminophen Itching   • Oxycodone-Acetaminophen Nausea And Vomiting              Vitals:    02/23/24 0942   BP: 114/81   Site: Left Upper Arm   Position: Sitting   Cuff Size: Small Adult   Pulse: 78   Resp: 18   Temp: 98.3 °F (36.8 °C)   SpO2: 98%   Weight: 65 kg (143 lb 3.2 oz)         Physical Exam  Constitutional:       General: He is not in acute distress.     Appearance: Normal appearance. He is not ill-appearing or toxic-appearing.   HENT:      Head: Normocephalic and atraumatic.      Right Ear: Ear canal and external ear normal.      Left Ear: Ear canal and external ear normal.      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      Mouth/Throat:      Mouth: Mucous membranes are moist.      Pharynx: Oropharynx is clear.   Eyes:      Extraocular Movements: Extraocular movements intact.      Conjunctiva/sclera: Conjunctivae normal.      Pupils: Pupils are equal, round, and reactive to light.   Cardiovascular:      Rate and Rhythm: Normal rate.   Pulmonary:      
[id number]

## 2024-02-26 DIAGNOSIS — B20 HIV INFECTION, UNSPECIFIED SYMPTOM STATUS (HCC): ICD-10-CM

## 2024-02-27 LAB — RPR SER QL: NONREACTIVE

## 2024-03-10 DIAGNOSIS — B20 HIV INFECTION, UNSPECIFIED SYMPTOM STATUS (HCC): ICD-10-CM

## 2024-03-12 RX ORDER — DOLUTEGRAVIR SODIUM 50 MG/1
50 TABLET, FILM COATED ORAL DAILY
Qty: 30 TABLET | Refills: 1 | Status: ACTIVE | OUTPATIENT
Start: 2024-03-12

## 2024-03-21 ENCOUNTER — OFFICE VISIT (OUTPATIENT)
Age: 59
End: 2024-03-21

## 2024-03-21 VITALS
TEMPERATURE: 98.3 F | RESPIRATION RATE: 18 BRPM | OXYGEN SATURATION: 94 % | SYSTOLIC BLOOD PRESSURE: 127 MMHG | DIASTOLIC BLOOD PRESSURE: 82 MMHG | WEIGHT: 149 LBS | BODY MASS INDEX: 22.58 KG/M2 | HEART RATE: 100 BPM | HEIGHT: 68 IN

## 2024-03-21 DIAGNOSIS — B20 HIV INFECTION, UNSPECIFIED SYMPTOM STATUS (HCC): ICD-10-CM

## 2024-03-21 DIAGNOSIS — Z86.19 HISTORY OF SHINGLES: ICD-10-CM

## 2024-03-21 DIAGNOSIS — E55.9 VITAMIN D DEFICIENCY: ICD-10-CM

## 2024-03-21 DIAGNOSIS — Z23 NEED FOR PROPHYLACTIC VACCINATION AGAINST STREPTOCOCCUS PNEUMONIAE (PNEUMOCOCCUS): Primary | ICD-10-CM

## 2024-03-21 ASSESSMENT — PATIENT HEALTH QUESTIONNAIRE - PHQ9
SUM OF ALL RESPONSES TO PHQ QUESTIONS 1-9: 0
SUM OF ALL RESPONSES TO PHQ QUESTIONS 1-9: 0
SUM OF ALL RESPONSES TO PHQ9 QUESTIONS 1 & 2: 0
SUM OF ALL RESPONSES TO PHQ QUESTIONS 1-9: 0
1. LITTLE INTEREST OR PLEASURE IN DOING THINGS: NOT AT ALL
2. FEELING DOWN, DEPRESSED OR HOPELESS: NOT AT ALL
SUM OF ALL RESPONSES TO PHQ QUESTIONS 1-9: 0

## 2024-03-21 NOTE — PROGRESS NOTES
Subjective:   Demond Franklin is a 57 y.o. male who presents for  HIV  follow up.  Not seen in office over 2 years.  Pt had covid last month. He had malaise, fever, joint pains. Now recovered.   But extremely fatigued.  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  Patient complains of not being able to sleep well at night.  Patient advised to relax, exercise.  No recent labs, except for RPR.  Patient to get labs done soon.  Patient advised to be up-to-date with annual wellness screening, colonoscopy, PSA screen..    Patient has previously been seen by Dr. Murcia, thereafter moved to [place] is back.  Patient and partner moved to [place].  He has been with his partner 25 years.  When was first positive test for HIV?  1998 in [place], [place].  Patient was started on medications around 1990 9/2000.  Patient was undetectable within a year.  Patient's counts were always good patient has never had AIDS.  No history of STDs besides HIV.  What was the reason for being tested?  Weight loss.   Initial CD4-unknown, >200   Initial VL-unknown  Mode of HIV infection: Sexual( MSM)  Patient is currently on Tivicay, Descovy.  Previously on Prezcobix, Genvoya.  Past history of smoking patient has now quit.  Alcohol use-socially  Patient has had shingles in the past.  Advised patient to get shingles vaccine.    omponent  Ref Range & Units 7/10/23 0905 9/26/22 0938 3/21/22 1042 9/14/21 0000   Absolute CD 4 [place]  359 - 1519 /uL 722      % CD 4 Pos. Lymph.  30.8 - 58.5 % 32.8      WBC  3.4 - 10.8 x10E3/uL 7.0 5.6 5.8 5.7     Component  Ref Range & Units    HIV-1 RNA by PCR  copies/mL 50   Comment: The reportable range for this assay is 20 to 10,000,000  copies HIV-1 RNA/mL.   log10 HIV-1 RNA  hzt45dvib/mL 1.699         Patient's usual source of health care:[facility] Specialist  Oppurtunistic Infections: none  Sexually transmitted diseases: none  Tuberculosis: Any known exposure to MTB  no  Anal Pap:  Immunizations by

## 2024-03-21 NOTE — PROGRESS NOTES
Room:   I have reviewed all needed documentation in preparation for visit. Verified patient by name and date of birth  Demond Franklin is a 58 y.o. male who presents for HIV and Follow-up      Vitals:    24 1246   BP: 127/82   Site: Left Upper Arm   Position: Sitting   Cuff Size: Medium Adult   Pulse: 100   Resp: 18   Temp: 98.3 °F (36.8 °C)   TempSrc: Oral   SpO2: 94%   Weight: 67.6 kg (149 lb)   Height: 1.727 m (5' 8\")       No LMP for male patient.    Pain Score:   0 - No pain    Health Maintenance Review: Patient reminded of \"due or due soon\" health maintenance. I have asked the patient to contact his/her primary care provider (PCP) for follow-up on his/her health maintenance.    \"Have you been to the ER, urgent care clinic since your last visit?  Hospitalized since your last visit?\"    Yes urgent care for covid     “Have you seen or consulted any other health care providers outside of [facility] since your last visit?”    NO    Recent Travel Screening and Travel History documentation:     Travel Screening       Question Response    Have you been in contact with someone who was sick? No / Unsure    Do you have any of the following new or worsening symptoms? None of these    Have you traveled internationally or domestically in the last month? No          Travel History   Travel since 24    No documented travel since 24        Verified name and  of patient. Pt comes in and desires an immunization, and is informed of all indications. Consent form completed and reviewed.     Allergies   Allergen Reactions    Hydrocodone-Acetaminophen Itching    Oxycodone-Acetaminophen Nausea And Vomiting       Immunization History   Administered Date(s) Administered    COVID-19, J&J, (age 18y+), IM, 0.5 mL 2021    Influenza Trivalent 2016    Influenza Virus Vaccine 2009, 2012, 10/24/2013    Influenza, High Dose (Fluzone 65 yrs and older) 2015    Influenza, Trivalent

## 2024-05-19 DIAGNOSIS — B20 HIV INFECTION, UNSPECIFIED SYMPTOM STATUS (HCC): ICD-10-CM

## 2024-05-20 RX ORDER — DOLUTEGRAVIR SODIUM 50 MG/1
50 TABLET, FILM COATED ORAL DAILY
Qty: 30 TABLET | Refills: 1 | Status: ACTIVE | OUTPATIENT
Start: 2024-05-20 | End: 2024-05-22 | Stop reason: SDUPTHER

## 2024-05-22 DIAGNOSIS — B20 HIV INFECTION, UNSPECIFIED SYMPTOM STATUS (HCC): ICD-10-CM

## 2024-05-23 RX ORDER — DOLUTEGRAVIR SODIUM 50 MG/1
50 TABLET, FILM COATED ORAL DAILY
Qty: 30 TABLET | Refills: 1 | Status: ACTIVE | OUTPATIENT
Start: 2024-05-23

## 2024-06-04 LAB
25(OH)D3+25(OH)D2 SERPL-MCNC: 29.9 NG/ML (ref 30–100)
ALBUMIN SERPL-MCNC: 4.3 G/DL (ref 3.8–4.9)
ALBUMIN/GLOB SERPL: 1.7 {RATIO} (ref 1.2–2.2)
ALP SERPL-CCNC: 76 IU/L (ref 44–121)
ALT SERPL-CCNC: 20 IU/L (ref 0–44)
AST SERPL-CCNC: 20 IU/L (ref 0–40)
BASOPHILS # BLD AUTO: 0.1 X10E3/UL (ref 0–0.2)
BASOPHILS NFR BLD AUTO: 1 %
BILIRUB SERPL-MCNC: 0.4 MG/DL (ref 0–1.2)
BUN SERPL-MCNC: 12 MG/DL (ref 6–24)
BUN/CREAT SERPL: 11 (ref 9–20)
CALCIUM SERPL-MCNC: 9.1 MG/DL (ref 8.7–10.2)
CD3+CD4+ CELLS # BLD: 614 /UL (ref 359–1519)
CD3+CD4+ CELLS NFR BLD: 32.3 % (ref 30.8–58.5)
CHLORIDE SERPL-SCNC: 106 MMOL/L (ref 96–106)
CO2 SERPL-SCNC: 23 MMOL/L (ref 20–29)
CREAT SERPL-MCNC: 1.12 MG/DL (ref 0.76–1.27)
EGFRCR SERPLBLD CKD-EPI 2021: 77 ML/MIN/1.73
EOSINOPHIL # BLD AUTO: 0.2 X10E3/UL (ref 0–0.4)
EOSINOPHIL NFR BLD AUTO: 3 %
ERYTHROCYTE [DISTWIDTH] IN BLOOD BY AUTOMATED COUNT: 11.6 % (ref 11.6–15.4)
GLOBULIN SER CALC-MCNC: 2.6 G/DL (ref 1.5–4.5)
GLUCOSE SERPL-MCNC: 85 MG/DL (ref 70–99)
HCT VFR BLD AUTO: 44.1 % (ref 37.5–51)
HGB BLD-MCNC: 15 G/DL (ref 13–17.7)
IMM GRANULOCYTES # BLD AUTO: 0 X10E3/UL (ref 0–0.1)
IMM GRANULOCYTES NFR BLD AUTO: 0 %
LYMPHOCYTES # BLD AUTO: 1.9 X10E3/UL (ref 0.7–3.1)
LYMPHOCYTES NFR BLD AUTO: 30 %
MCH RBC QN AUTO: 34.3 PG (ref 26.6–33)
MCHC RBC AUTO-ENTMCNC: 34 G/DL (ref 31.5–35.7)
MCV RBC AUTO: 101 FL (ref 79–97)
MONOCYTES # BLD AUTO: 0.6 X10E3/UL (ref 0.1–0.9)
MONOCYTES NFR BLD AUTO: 10 %
NEUTROPHILS # BLD AUTO: 3.5 X10E3/UL (ref 1.4–7)
NEUTROPHILS NFR BLD AUTO: 56 %
PLATELET # BLD AUTO: 222 X10E3/UL (ref 150–450)
POTASSIUM SERPL-SCNC: 4.5 MMOL/L (ref 3.5–5.2)
PROT SERPL-MCNC: 6.9 G/DL (ref 6–8.5)
RBC # BLD AUTO: 4.37 X10E6/UL (ref 4.14–5.8)
SODIUM SERPL-SCNC: 144 MMOL/L (ref 134–144)
WBC # BLD AUTO: 6.3 X10E3/UL (ref 3.4–10.8)

## 2024-06-05 LAB
HIV1 RNA # SERPL NAA+PROBE: <20 COPIES/ML
HIV1 RNA SERPL NAA+PROBE-LOG#: NORMAL LOG10COPY/ML

## 2024-07-01 ENCOUNTER — OFFICE VISIT (OUTPATIENT)
Age: 59
End: 2024-07-01

## 2024-07-01 VITALS
WEIGHT: 143 LBS | DIASTOLIC BLOOD PRESSURE: 77 MMHG | RESPIRATION RATE: 17 BRPM | SYSTOLIC BLOOD PRESSURE: 120 MMHG | BODY MASS INDEX: 21.74 KG/M2 | TEMPERATURE: 98.7 F | OXYGEN SATURATION: 100 % | HEART RATE: 100 BPM

## 2024-07-01 DIAGNOSIS — L03.012 PARONYCHIA OF FINGER OF LEFT HAND: Primary | ICD-10-CM

## 2024-07-01 RX ORDER — CEPHALEXIN 500 MG/1
500 CAPSULE ORAL 4 TIMES DAILY
Qty: 28 CAPSULE | Refills: 0 | Status: SHIPPED | OUTPATIENT
Start: 2024-07-01 | End: 2024-07-08

## 2024-07-01 NOTE — PATIENT INSTRUCTIONS
Patient was seen today for a paronychia of the left index finger  There does not appear to be an abscess or an infection that requires draining today  Patient has tried topical and over-the-counter therapies, will treat with oral antibiotics  Keflex, 4 times daily for 7 days  Continue to soak the finger in warm water twice per day  Continue to monitor your symptoms, if you develop fevers, chills, body aches, rapidly spreading redness/swelling or purulent drainage, please return for reevaluation

## 2024-07-01 NOTE — PROGRESS NOTES
Demond Franklin (:  1965) is a 58 y.o. male,Established patient, here for evaluation of the following chief complaint(s):  Swelling (Index finger has a possible hair in it and its swollen )      Assessment & Plan :  Visit Diagnoses and Associated Orders       Paronychia of finger of left hand    -  Primary    cephALEXin (KEFLEX) 500 MG capsule [9500]                 Patient was seen today for a paronychia of the left index finger  There does not appear to be an abscess or an infection that requires draining today  Patient has tried topical and over-the-counter therapies, will treat with oral antibiotics  Keflex, 4 times daily for 7 days  Continue to soak the finger in warm water twice per day  Continue to monitor your symptoms, if you develop fevers, chills, body aches, rapidly spreading redness/swelling or purulent drainage, please return for reevaluation       Subjective :    Swelling       58 y.o. male presents with symptoms of 2 weeks of swelling and pain to his left index finger.  He denies any fevers, chills, body aches.  Swelling is located at the base of his fingernail and is associated with some redness and tenderness.  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  He has been soaking the finger in warm salt water and applying topical antibiotic ointments without relief.  He has tried squeezing the finger but has not been able to squeeze any pus or discharge from the affected area.  Denies any allergies to antibiotics, no kidney or liver problems.         Vitals:    24 1301   BP: 120/77   Site: Left Upper Arm   Pulse: 100   Resp: 17   Temp: 98.7 °F (37.1 °C)   SpO2: 100%   Weight: 64.9 kg (143 lb)       No results found for this visit on 24.      Objective   Physical Exam  Vitals and nursing note reviewed.   Constitutional:       General: He is not in acute distress.     Appearance: Normal

## 2024-08-19 DIAGNOSIS — B20 HIV INFECTION, UNSPECIFIED SYMPTOM STATUS (HCC): ICD-10-CM

## 2024-08-20 RX ORDER — EMTRICITABINE AND TENOFOVIR ALAFENAMIDE 200; 25 MG/1; MG/1
1 TABLET ORAL DAILY
Qty: 90 TABLET | Refills: 0 | Status: ACTIVE | OUTPATIENT
Start: 2024-08-20 | End: 2024-11-18

## 2024-09-04 NOTE — PROGRESS NOTES
Attempted to notify pt of results. No answer, left message for pt to return call. Echo is scheduled for 9/10.     Dr. Ayden Park reviewed labs that were completed on 8/28.     Will forward message to clinical team to inquire if pt is able to complete a CTA of the chest with contrast.     Mariella Jorgensen RN, BSN

## 2024-09-23 ENCOUNTER — HOSPITAL ENCOUNTER (OUTPATIENT)
Facility: HOSPITAL | Age: 59
Discharge: HOME | End: 2024-09-26

## 2024-09-23 LAB
BASOPHILS # BLD: 0.1 K/UL (ref 0–0.1)
BASOPHILS NFR BLD: 1 % (ref 0–1)
DIFFERENTIAL METHOD BLD: ABNORMAL
EOSINOPHIL # BLD: 0.1 K/UL (ref 0–0.4)
EOSINOPHIL NFR BLD: 2 % (ref 0–7)
ERYTHROCYTE [DISTWIDTH] IN BLOOD BY AUTOMATED COUNT: 11.3 % (ref 11.5–14.5)
HCT VFR BLD AUTO: 43.6 % (ref 36.6–50.3)
HGB BLD-MCNC: 14.7 G/DL (ref 12.1–17)
IMM GRANULOCYTES # BLD AUTO: 0 K/UL (ref 0–0.04)
IMM GRANULOCYTES NFR BLD AUTO: 0 % (ref 0–0.5)
LYMPHOCYTES # BLD: 2 K/UL (ref 0.8–3.5)
LYMPHOCYTES NFR BLD: 27 % (ref 12–49)
MCH RBC QN AUTO: 34.6 PG (ref 26–34)
MCHC RBC AUTO-ENTMCNC: 33.7 G/DL (ref 30–36.5)
MCV RBC AUTO: 102.6 FL (ref 80–99)
MONOCYTES # BLD: 0.8 K/UL (ref 0–1)
MONOCYTES NFR BLD: 11 % (ref 5–13)
NEUTS SEG # BLD: 4.3 K/UL (ref 1.8–8)
NEUTS SEG NFR BLD: 59 % (ref 32–75)
NRBC # BLD: 0 K/UL (ref 0–0.01)
NRBC BLD-RTO: 0 PER 100 WBC
PLATELET # BLD AUTO: 236 K/UL (ref 150–400)
PMV BLD AUTO: 9.8 FL (ref 8.9–12.9)
RBC # BLD AUTO: 4.25 M/UL (ref 4.1–5.7)
WBC # BLD AUTO: 7.3 K/UL (ref 4.1–11.1)

## 2024-10-03 ENCOUNTER — OFFICE VISIT (OUTPATIENT)
Age: 59
End: 2024-10-03
Payer: COMMERCIAL

## 2024-10-03 VITALS
SYSTOLIC BLOOD PRESSURE: 118 MMHG | HEIGHT: 68 IN | BODY MASS INDEX: 21.98 KG/M2 | RESPIRATION RATE: 16 BRPM | OXYGEN SATURATION: 93 % | DIASTOLIC BLOOD PRESSURE: 76 MMHG | WEIGHT: 145 LBS | HEART RATE: 96 BPM | TEMPERATURE: 98.1 F

## 2024-10-03 DIAGNOSIS — Z21 HIV INFECTION, UNSPECIFIED SYMPTOM STATUS (HCC): Primary | ICD-10-CM

## 2024-10-03 DIAGNOSIS — K64.0 GRADE I HEMORRHOIDS: ICD-10-CM

## 2024-10-03 DIAGNOSIS — E55.9 VITAMIN D DEFICIENCY: ICD-10-CM

## 2024-10-03 DIAGNOSIS — Z86.19 HISTORY OF SHINGLES: ICD-10-CM

## 2024-10-03 DIAGNOSIS — Z21 HIV INFECTION, UNSPECIFIED SYMPTOM STATUS (HCC): ICD-10-CM

## 2024-10-03 PROCEDURE — 99214 OFFICE O/P EST MOD 30 MIN: CPT | Performed by: INTERNAL MEDICINE

## 2024-10-03 RX ORDER — EMTRICITABINE AND TENOFOVIR ALAFENAMIDE 200; 25 MG/1; MG/1
1 TABLET ORAL DAILY
Qty: 90 TABLET | Refills: 0 | Status: ACTIVE | OUTPATIENT
Start: 2024-10-03 | End: 2025-01-01

## 2024-10-03 RX ORDER — HYDROCORTISONE ACETATE 25 MG/1
25 SUPPOSITORY RECTAL 2 TIMES DAILY PRN
Qty: 20 SUPPOSITORY | Refills: 0 | Status: SHIPPED | OUTPATIENT
Start: 2024-10-03 | End: 2024-10-13

## 2024-10-03 NOTE — PROGRESS NOTES
Room:   Chief Complaint   Patient presents with    HIV     Prior to Admission medications    Medication Sig Start Date End Date Taking? Authorizing Provider   dolutegravir sodium (TIVICAY) 50 MG tablet Take 1 tablet by mouth daily 5/23/24  Yes Phuong Crockett MD   emtricitabine-tenofovir alafenamide (DESCOVY) 200-25 MG TABS tablet Take 1 tablet by mouth daily 11/1/23  Yes Phuong Crockett MD   emtricitabine-tenofovir alafenamide (DESCOVY) 200-25 MG TABS tablet Take 1 tablet by mouth daily  Patient not taking: Reported on 10/3/2024 8/20/24 11/18/24  Phuong Crockett MD   dolutegravir sodium (TIVICAY) 50 MG tablet Take 1 tablet by mouth daily  Patient not taking: Reported on 10/3/2024 8/20/24   Phuong Crockett MD   emtricitabine-tenofovir alafenamide (DESCOVY) 200-25 MG TABS tablet Take 1 tablet by mouth daily  Patient not taking: Reported on 10/3/2024 6/14/23   Phuong Crockett MD   emtricitabine-tenofovir alafenamide (DESCOVY) 120-15 MG TABS tablet Take 1 tablet by mouth daily  Patient not taking: Reported on 10/3/2024 2/7/23   Automatic Reconciliation, Ar   dolutegravir sodium (TIVICAY) 50 MG tablet Take by mouth daily  Patient not taking: Reported on 10/3/2024 1/10/22   Automatic Reconciliation, Ar     Vitals:    10/03/24 1337   BP: 118/76   Site: Left Upper Arm   Position: Sitting   Cuff Size: Large Adult   Pulse: 96   Resp: 16   Temp: 98.1 °F (36.7 °C)   TempSrc: Oral   SpO2: 93%   Weight: 65.8 kg (145 lb)   Height: 1.727 m (5' 8\")       No data recorded      I have reviewed all needed documentation in preparation for visit. Verified patient by name and date of birth  Demond Franklin is a 59 y.o. male who presents for HIV  .    No LMP for male patient.    Pain Score:   0 - No pain    Health Maintenance Review: Patient reminded of \"due or due soon\" health maintenance. I have asked the patient to contact his/her primary care provider (PCP) for follow-up on his/her health

## 2024-10-03 NOTE — PROGRESS NOTES
Subjective:   Demond Franklin is a 59 y.o. male who presents for  HIV  follow up.  Pt is compliant with meds . Pt still working long hours at hair salon..  Pt is having hemorrhoids & requesting medication for it.  Pt advised to do Wellness exam, Flu shot.  Patient states he will get flu shot at PCP office  Patient has previously been seen by Dr. Murcia, thereafter moved to [place] is back.  Patient and partner moved to [place].  He has been with his partner 25 years.  When was first positive test for HIV?  1998 in [place], [place].  Patient was started on medications around 1990 9/2000.  Patient was undetectable within a year.  Patient's counts were always good patient has never had AIDS.  No history of STDs besides HIV.  What was the reason for being tested?  Weight loss.   Initial CD4-unknown, >200   Initial VL-unknown  Mode of HIV infection: Sexual( MSM)  Patient is currently on Tivicay, Descovy.  Previously on Prezcobix, Genvoya.  Past history of smoking patient has now quit.  Alcohol use-socially  Patient has had shingles in the past.  Advised patient to get shingles vaccine.  Labs discussed with patient    Component  Ref Range & Units    HIV 1 RNA QN RT PCR copies/mL  copies/mL <20     Component  Ref Range & Units 6/3/24 0000 7/10/23 0905 9/26/22 0938 3/21/22 1042 9/14/21 0000   Absolute CD 4 [place]  359 - 1519 /uL 614 722 537 R 581 R 615 R   % CD4 POS LYMPH  30.8 - 58.5 % 32.3 32.8 31.6 34.2 36.2     Component  Ref Range & Units 6/3/24 0000 9/26/22 0938 3/21/22 1042   Vit D, 25-Hydroxy  30.0 - 100.0 ng/mL 29.9 Low  37.1 CM 28.1 Low  CM         Immunization History   Administered Date(s) Administered    COVID-19, J&J, (age 18y+), IM, 0.5 mL 03/08/2021    Influenza Trivalent 11/14/2016    Influenza Virus Vaccine 09/14/2009, 12/06/2012, 10/24/2013    Influenza, FLUZONE High Dose, (age 65 y+), IM, Trivalent PF, 0.5mL 11/02/2015    Influenza, Trivalent PF 10/27/2014    Pneumococcal Vaccine 02/14/2011

## 2024-11-18 ENCOUNTER — OFFICE VISIT (OUTPATIENT)
Age: 59
End: 2024-11-18

## 2024-11-18 VITALS
TEMPERATURE: 98.2 F | SYSTOLIC BLOOD PRESSURE: 95 MMHG | WEIGHT: 144 LBS | BODY MASS INDEX: 21.9 KG/M2 | RESPIRATION RATE: 16 BRPM | DIASTOLIC BLOOD PRESSURE: 69 MMHG | OXYGEN SATURATION: 97 % | HEART RATE: 85 BPM

## 2024-11-18 DIAGNOSIS — L08.9 INFECTION OF SKIN OF FINGER: Primary | ICD-10-CM

## 2024-11-18 RX ORDER — MUPIROCIN 20 MG/G
OINTMENT TOPICAL 2 TIMES DAILY
Qty: 22 G | Refills: 0 | Status: SHIPPED | OUTPATIENT
Start: 2024-11-18 | End: 2024-11-25

## 2024-11-18 RX ORDER — CEPHALEXIN 500 MG/1
500 CAPSULE ORAL 4 TIMES DAILY
Qty: 28 CAPSULE | Refills: 0 | Status: SHIPPED | OUTPATIENT
Start: 2024-11-18 | End: 2024-11-25

## 2024-11-18 NOTE — PROGRESS NOTES
infection. Sensitivity/pain to light touch around the erythematous area. No fluid pockets or fluctuance concerning for abscess noted. Low concern for osteomyelitis.  Patient to be discharged home with Keflex and Bactroban .  Patient given return and ED precautions and encouraged to follow up with PCP in 3-5 days or for persistent or recurrent symptoms. Patient verbalized understanding and agreement with care plan.           An electronic signature was used to authenticate this note.    Lana Paul PA-C

## 2024-11-18 NOTE — PATIENT INSTRUCTIONS
Keep skin clean with warm soapy water and pat dry, no peroxide.  Avoid submerging hand.  Use topical and take oral antibiotic as directed.  Return to Urgent care for worsening of symptoms.

## 2025-03-17 DIAGNOSIS — Z21 HIV INFECTION, UNSPECIFIED SYMPTOM STATUS (HCC): ICD-10-CM

## 2025-03-17 LAB
ALBUMIN SERPL-MCNC: 4 G/DL (ref 3.5–5)
ALBUMIN/GLOB SERPL: 1.2 (ref 1.1–2.2)
ALP SERPL-CCNC: 75 U/L (ref 45–117)
ALT SERPL-CCNC: 20 U/L (ref 12–78)
ANION GAP SERPL CALC-SCNC: 7 MMOL/L (ref 2–12)
AST SERPL-CCNC: 16 U/L (ref 15–37)
BASOPHILS # BLD: 0.06 K/UL (ref 0–0.1)
BASOPHILS NFR BLD: 1 % (ref 0–1)
BILIRUB SERPL-MCNC: 0.5 MG/DL (ref 0.2–1)
BUN SERPL-MCNC: 12 MG/DL (ref 6–20)
BUN/CREAT SERPL: 11 (ref 12–20)
CALCIUM SERPL-MCNC: 9.4 MG/DL (ref 8.5–10.1)
CHLORIDE SERPL-SCNC: 105 MMOL/L (ref 97–108)
CO2 SERPL-SCNC: 28 MMOL/L (ref 21–32)
COMMENT:: NORMAL
CREAT SERPL-MCNC: 1.13 MG/DL (ref 0.7–1.3)
DIFFERENTIAL METHOD BLD: ABNORMAL
EOSINOPHIL # BLD: 0.17 K/UL (ref 0–0.4)
EOSINOPHIL NFR BLD: 2.8 % (ref 0–7)
ERYTHROCYTE [DISTWIDTH] IN BLOOD BY AUTOMATED COUNT: 11.2 % (ref 11.5–14.5)
GLOBULIN SER CALC-MCNC: 3.3 G/DL (ref 2–4)
GLUCOSE SERPL-MCNC: 100 MG/DL (ref 65–100)
HBV SURFACE AB SER QL: REACTIVE
HBV SURFACE AB SER-ACNC: 125.9 MIU/ML
HCT VFR BLD AUTO: 45.9 % (ref 36.6–50.3)
HCV AB SER IA-ACNC: 0.09 INDEX
HCV AB SERPL QL IA: NONREACTIVE
HGB BLD-MCNC: 15.1 G/DL (ref 12.1–17)
IMM GRANULOCYTES # BLD AUTO: 0.03 K/UL (ref 0–0.04)
IMM GRANULOCYTES NFR BLD AUTO: 0.5 % (ref 0–0.5)
LYMPHOCYTES # BLD: 2.2 K/UL (ref 0.8–3.5)
LYMPHOCYTES NFR BLD: 35.7 % (ref 12–49)
MCH RBC QN AUTO: 33.8 PG (ref 26–34)
MCHC RBC AUTO-ENTMCNC: 32.9 G/DL (ref 30–36.5)
MCV RBC AUTO: 102.7 FL (ref 80–99)
MONOCYTES # BLD: 0.64 K/UL (ref 0–1)
MONOCYTES NFR BLD: 10.4 % (ref 5–13)
NEUTS SEG # BLD: 3.06 K/UL (ref 1.8–8)
NEUTS SEG NFR BLD: 49.6 % (ref 32–75)
NRBC # BLD: 0 K/UL (ref 0–0.01)
NRBC BLD-RTO: 0 PER 100 WBC
PLATELET # BLD AUTO: 209 K/UL (ref 150–400)
PMV BLD AUTO: 10 FL (ref 8.9–12.9)
POTASSIUM SERPL-SCNC: 3.8 MMOL/L (ref 3.5–5.1)
PROT SERPL-MCNC: 7.3 G/DL (ref 6.4–8.2)
RBC # BLD AUTO: 4.47 M/UL (ref 4.1–5.7)
RPR SER QL: NONREACTIVE
SODIUM SERPL-SCNC: 140 MMOL/L (ref 136–145)
SPECIMEN HOLD: NORMAL
WBC # BLD AUTO: 6.2 K/UL (ref 4.1–11.1)

## 2025-03-18 LAB
BASOPHILS # BLD AUTO: 0.1 X10E3/UL (ref 0–0.2)
BASOPHILS NFR BLD AUTO: 1 %
CD3+CD4+ CELLS # BLD: 821 /UL (ref 359–1519)
CD3+CD4+ CELLS NFR BLD: 35.7 % (ref 30.8–58.5)
EOSINOPHIL # BLD AUTO: 0.2 X10E3/UL (ref 0–0.4)
EOSINOPHIL NFR BLD AUTO: 2 %
ERYTHROCYTE [DISTWIDTH] IN BLOOD BY AUTOMATED COUNT: 11 % (ref 11.6–15.4)
HCT VFR BLD AUTO: 44.8 % (ref 37.5–51)
HGB BLD-MCNC: 15.2 G/DL (ref 13–17.7)
HIV1 RNA # SERPL NAA+PROBE: <20 COPIES/ML
HIV1 RNA SERPL NAA+PROBE-LOG#: NORMAL LOG10COPY/ML
IMM GRANULOCYTES # BLD: 0 X10E3/UL (ref 0–0.1)
IMM GRANULOCYTES NFR BLD: 0 %
LYMPHOCYTES # BLD AUTO: 2.3 X10E3/UL (ref 0.7–3.1)
LYMPHOCYTES NFR BLD AUTO: 37 %
MCH RBC QN AUTO: 34.2 PG (ref 26.6–33)
MCHC RBC AUTO-ENTMCNC: 33.9 G/DL (ref 31.5–35.7)
MCV RBC AUTO: 101 FL (ref 79–97)
MONOCYTES # BLD AUTO: 0.6 X10E3/UL (ref 0.1–0.9)
MONOCYTES NFR BLD AUTO: 10 %
NEUTROPHILS # BLD AUTO: 3.1 X10E3/UL (ref 1.4–7)
NEUTROPHILS NFR BLD AUTO: 50 %
PLATELET # BLD AUTO: 225 X10E3/UL (ref 150–450)
RBC # BLD AUTO: 4.44 X10E6/UL (ref 4.14–5.8)
WBC # BLD AUTO: 6.2 X10E3/UL (ref 3.4–10.8)

## 2025-04-01 ENCOUNTER — TELEPHONE (OUTPATIENT)
Age: 60
End: 2025-04-01

## 2025-04-03 DIAGNOSIS — Z21 HIV INFECTION, UNSPECIFIED SYMPTOM STATUS (HCC): ICD-10-CM

## 2025-04-03 RX ORDER — DOLUTEGRAVIR SODIUM 50 MG/1
50 TABLET, FILM COATED ORAL DAILY
Qty: 90 TABLET | Refills: 0 | Status: ACTIVE | OUTPATIENT
Start: 2025-04-03

## 2025-04-03 RX ORDER — EMTRICITABINE AND TENOFOVIR ALAFENAMIDE 200; 25 MG/1; MG/1
1 TABLET ORAL DAILY
Qty: 90 TABLET | Refills: 0 | Status: ACTIVE | OUTPATIENT
Start: 2025-04-03

## 2025-04-08 ENCOUNTER — TELEPHONE (OUTPATIENT)
Age: 60
End: 2025-04-08

## 2025-04-08 NOTE — TELEPHONE ENCOUNTER
Patient called stating that his appointment was not on the first it was on the 10th. He would like another appointment.

## 2025-04-09 ENCOUNTER — TELEPHONE (OUTPATIENT)
Age: 60
End: 2025-04-09

## 2025-04-09 NOTE — TELEPHONE ENCOUNTER
Gave patient an appointment on September the 25 th @1:30 pm he stated this was the soonest he would be able to come in. He was upset because his appointment on the 1st was supposed to be on the 10th. This is why he was not here that day.

## 2025-04-09 NOTE — TELEPHONE ENCOUNTER
He says that his schedule is not open for that day so he will have to just keep the September appointment for now.

## 2025-04-10 ENCOUNTER — OFFICE VISIT (OUTPATIENT)
Age: 60
End: 2025-04-10
Payer: COMMERCIAL

## 2025-04-10 VITALS
WEIGHT: 148 LBS | RESPIRATION RATE: 18 BRPM | HEIGHT: 68 IN | DIASTOLIC BLOOD PRESSURE: 83 MMHG | SYSTOLIC BLOOD PRESSURE: 128 MMHG | TEMPERATURE: 98 F | BODY MASS INDEX: 22.43 KG/M2 | HEART RATE: 98 BPM

## 2025-04-10 DIAGNOSIS — K64.0 GRADE I HEMORRHOIDS: ICD-10-CM

## 2025-04-10 DIAGNOSIS — Z21 HIV INFECTION, UNSPECIFIED SYMPTOM STATUS (HCC): ICD-10-CM

## 2025-04-10 DIAGNOSIS — Z86.19 HISTORY OF SHINGLES: Primary | ICD-10-CM

## 2025-04-10 DIAGNOSIS — E55.9 VITAMIN D DEFICIENCY: ICD-10-CM

## 2025-04-10 DIAGNOSIS — Z87.891 HISTORY OF TOBACCO USE: ICD-10-CM

## 2025-04-10 PROCEDURE — 99214 OFFICE O/P EST MOD 30 MIN: CPT | Performed by: INTERNAL MEDICINE

## 2025-04-10 PROCEDURE — 90715 TDAP VACCINE 7 YRS/> IM: CPT | Performed by: INTERNAL MEDICINE

## 2025-04-10 PROCEDURE — 90471 IMMUNIZATION ADMIN: CPT | Performed by: INTERNAL MEDICINE

## 2025-04-10 RX ORDER — EMTRICITABINE AND TENOFOVIR ALAFENAMIDE 200; 25 MG/1; MG/1
1 TABLET ORAL DAILY
Qty: 90 TABLET | Refills: 0 | Status: SHIPPED | OUTPATIENT
Start: 2025-04-10

## 2025-04-10 ASSESSMENT — PATIENT HEALTH QUESTIONNAIRE - PHQ9
SUM OF ALL RESPONSES TO PHQ QUESTIONS 1-9: 0
SUM OF ALL RESPONSES TO PHQ QUESTIONS 1-9: 0
2. FEELING DOWN, DEPRESSED OR HOPELESS: NOT AT ALL
SUM OF ALL RESPONSES TO PHQ QUESTIONS 1-9: 0
1. LITTLE INTEREST OR PLEASURE IN DOING THINGS: NOT AT ALL
SUM OF ALL RESPONSES TO PHQ QUESTIONS 1-9: 0

## 2025-04-10 NOTE — PROGRESS NOTES
Room:   Chief Complaint   Patient presents with    HIV    ID F/U     Prior to Admission medications    Medication Sig Start Date End Date Taking? Authorizing Provider   dolutegravir sodium (TIVICAY) 50 MG tablet TAKE 1 TABLET BY MOUTH EVERY DAY 4/3/25  Yes Phuong Crockett MD   emtricitabine-tenofovir alafenamide (DESCOVY) 200-25 MG TABS tablet TAKE 1 TABLET BY MOUTH EVERY DAY 4/3/25  Yes Phuong Crockett MD     Vitals:    04/10/25 1305   BP: 128/83   BP Site: Right Upper Arm   Patient Position: Sitting   BP Cuff Size: Large Adult   Pulse: 98   Resp: 18   Temp: 98 °F (36.7 °C)   TempSrc: Oral   Weight: 67.1 kg (148 lb)   Height: 1.727 m (5' 8\")       PHQ-9 Total Score: 0 (4/10/2025  1:06 PM)        I have reviewed all needed documentation in preparation for visit. Verified patient by name and date of birth  Demond Franklin is a 59 y.o. male who presents for HIV and ID F/U  .    No LMP for male patient.    Bronchitis 2 months ago. Took Mucinex before labs.    Health Maintenance Review: Patient reminded of \"due or due soon\" health maintenance. I have asked the patient to contact his/her primary care provider (PCP) for follow-up on his/her health maintenance.    \"Have you been to the ER, urgent care clinic since your last visit?  Hospitalized since your last visit?\"    NO    “Have you seen or consulted any other health care providers outside of [facility] since your last visit?”    NO       
  Housing Stability: Not on file     Heterosexual- yes  Drug used discussed no  Is the patient sexually active? yes  Safer sex guidelines discussed yes  If yes, partner(s)  aware of patient's status? yes      Psychiatric history.depression        Objective:     There were no vitals taken for this visit.    PHYSICAL EXAM:  General:          WD, WN. Alert, cooperative, no acute distress    EENT:              EOMI. Anicteric sclerae. MMM  Resp:               CTA bilaterally, no wheezing or rales.  No accessory muscle use  CV:                  Regular  rhythm,  No edema  GI:                   Soft, Non distended, Non tender.  +Bowel sounds  Neurologic:      Alert and oriented X 3, normal speech,   Psych:             Good insight. Not anxious nor agitated  Skin:                No rashes.  No jaundice.           IMPRESSION:   Asymptomatic HIV stable on Tivicay Descovy  P/H  Shingles,pt advised to get shingles vaccine at pharmacy  Dyslipidemia follow-up with PCP  Vitamin D deficiency to check vitamin D  Smoking history , very occasional use  Hemorrhoids for anusol       PLAN:   Continue to Tivicay,Descovy  Healthy diet regular exercise  Tylenol PM or melatonin to help with sleep.  For Tdap today  Patient advised to get to annual wellness visit , flu vaccine colon screening, PSA  Labs and follow-up 6 months     Contin

## 2025-07-10 DIAGNOSIS — Z21 HIV INFECTION, UNSPECIFIED SYMPTOM STATUS (HCC): ICD-10-CM

## 2025-07-10 RX ORDER — DOLUTEGRAVIR SODIUM 50 MG/1
50 TABLET, FILM COATED ORAL DAILY
Qty: 90 TABLET | Refills: 0 | Status: ACTIVE | OUTPATIENT
Start: 2025-07-10